# Patient Record
Sex: FEMALE | Race: WHITE | Employment: FULL TIME | URBAN - METROPOLITAN AREA
[De-identification: names, ages, dates, MRNs, and addresses within clinical notes are randomized per-mention and may not be internally consistent; named-entity substitution may affect disease eponyms.]

---

## 2018-10-08 ENCOUNTER — OFFICE VISIT (OUTPATIENT)
Dept: FAMILY MEDICINE CLINIC | Facility: CLINIC | Age: 53
End: 2018-10-08
Payer: COMMERCIAL

## 2018-10-08 VITALS
DIASTOLIC BLOOD PRESSURE: 70 MMHG | RESPIRATION RATE: 17 BRPM | HEART RATE: 57 BPM | OXYGEN SATURATION: 98 % | WEIGHT: 160 LBS | HEIGHT: 60 IN | SYSTOLIC BLOOD PRESSURE: 110 MMHG | BODY MASS INDEX: 31.41 KG/M2

## 2018-10-08 DIAGNOSIS — Z12.31 ENCOUNTER FOR SCREENING MAMMOGRAM FOR BREAST CANCER: ICD-10-CM

## 2018-10-08 DIAGNOSIS — E66.09 NON MORBID OBESITY DUE TO EXCESS CALORIES: ICD-10-CM

## 2018-10-08 DIAGNOSIS — E78.00 HYPERCHOLESTEREMIA: ICD-10-CM

## 2018-10-08 DIAGNOSIS — Z00.01 ENCOUNTER FOR ROUTINE ADULT HEALTH EXAMINATION WITH ABNORMAL FINDINGS: Primary | ICD-10-CM

## 2018-10-08 DIAGNOSIS — F32.0 CURRENT MILD EPISODE OF MAJOR DEPRESSIVE DISORDER WITHOUT PRIOR EPISODE (HCC): ICD-10-CM

## 2018-10-08 DIAGNOSIS — Z13.31 DEPRESSION SCREENING: ICD-10-CM

## 2018-10-08 PROCEDURE — 90471 IMMUNIZATION ADMIN: CPT

## 2018-10-08 PROCEDURE — 99386 PREV VISIT NEW AGE 40-64: CPT

## 2018-10-08 PROCEDURE — 90686 IIV4 VACC NO PRSV 0.5 ML IM: CPT

## 2018-10-08 RX ORDER — ATORVASTATIN CALCIUM 20 MG/1
20 TABLET, FILM COATED ORAL NIGHTLY
COMMUNITY
End: 2018-10-08

## 2018-10-09 PROBLEM — Z13.31 DEPRESSION SCREENING: Status: ACTIVE | Noted: 2018-10-09

## 2018-10-09 PROBLEM — E66.09 NON MORBID OBESITY DUE TO EXCESS CALORIES: Status: ACTIVE | Noted: 2018-10-09

## 2018-10-09 PROBLEM — E78.00 HYPERCHOLESTEREMIA: Status: ACTIVE | Noted: 2018-10-09

## 2018-10-09 PROBLEM — F32.0 CURRENT MILD EPISODE OF MAJOR DEPRESSIVE DISORDER WITHOUT PRIOR EPISODE (HCC): Status: ACTIVE | Noted: 2018-10-09

## 2018-10-09 PROBLEM — Z00.01 ENCOUNTER FOR ROUTINE ADULT HEALTH EXAMINATION WITH ABNORMAL FINDINGS: Status: ACTIVE | Noted: 2018-10-09

## 2018-10-09 PROBLEM — Z12.31 ENCOUNTER FOR SCREENING MAMMOGRAM FOR BREAST CANCER: Status: ACTIVE | Noted: 2018-10-09

## 2018-10-09 PROBLEM — F32.0 CURRENT MILD EPISODE OF MAJOR DEPRESSIVE DISORDER WITHOUT PRIOR EPISODE: Status: ACTIVE | Noted: 2018-10-09

## 2018-10-09 NOTE — PROGRESS NOTES
CC: Annual Physical Exam    HPI:   Josey Torres is a 52year old female who presents for a complete physical exam. Symptoms: denies discharge, itching, burning or dysuria, is menopausal. Patient denies any acute complaints at this time.      Wt Readings fr constipation, diarrhea, or blood in stool. MUSCULOSKELETAL:  Denies weakness, muscle aches, back pain, joint pain, swelling or stiffness.   NEUROLOGICAL:  Denies headache, seizures, dizziness, syncope, paralysis, ataxia, numbness or tingling in the extremi FROM.  : Deferred. RECTAL: Deferred. EXTREMITIES:  No edema, no cyanosis, no clubbing, FROM, 2+ dorsalis pedis pulses bilaterally. NEURO:  No deficit, normal gait, strength and tone, sensory intact, normal reflexes.     ASSESSMENT AND PLAN:   Hakeem Walton the LAST 2 WEEKS   Little interest or pleasure in doing things (over the last two weeks)?: Nearly every day    Feeling down, depressed, or hopeless (over the last two weeks)?: Nearly every day    PHQ-2 SCORE: 6     PHQ-9 Depression Screen     1.  Little int

## 2018-10-09 NOTE — PATIENT INSTRUCTIONS
Pautas de prevención para mujeres de entre 48 y 59 años de edad  [address] detección y las vacunas son importantes para el manejo de mendoza aviva.  La consejería sobre mendoza aviva también es esencial. A continuación, verá pautas en relación con esos temas p Clamidia Las mujeres con mayor riesgo de infección En los exámenes de rutina   Cáncer colorrectal Todas las mujeres de annie oneil de edad Ruma sigmoidoscopia flexible cada sisi años o ruma colonoscopia cada [place], o un enema con doble contraste de Liam Vigil Donniematt 29 de annie oneil de edad que no tienen registro de maryanne tenido esta infección o haberse aplicado esta vacuna Dos dosis.  La segunda dosis debería aplicársela al menos cuatro semanas después de la primera dosis   Hepatitis A Las muje Contra el herpes zóster [employer]  de 61 años o más [place] dosis   Consejería [place]   Pruebas sobre mutación de los genes BRCA para kelvin el riesgo de tener cáncer de ovario y cáncer de seno Las mujeres con mayor riesgo de tener mutación de

## 2018-10-15 ENCOUNTER — APPOINTMENT (OUTPATIENT)
Dept: LAB | Facility: HOSPITAL | Age: 53
End: 2018-10-15
Payer: COMMERCIAL

## 2018-10-15 DIAGNOSIS — Z00.01 ENCOUNTER FOR ROUTINE ADULT HEALTH EXAMINATION WITH ABNORMAL FINDINGS: ICD-10-CM

## 2018-10-15 DIAGNOSIS — E78.00 HYPERCHOLESTEREMIA: ICD-10-CM

## 2018-10-15 PROCEDURE — 81001 URINALYSIS AUTO W/SCOPE: CPT

## 2018-10-15 PROCEDURE — 84443 ASSAY THYROID STIM HORMONE: CPT

## 2018-10-15 PROCEDURE — 36415 COLL VENOUS BLD VENIPUNCTURE: CPT

## 2018-10-15 PROCEDURE — 80053 COMPREHEN METABOLIC PANEL: CPT

## 2018-10-15 PROCEDURE — 85027 COMPLETE CBC AUTOMATED: CPT

## 2018-10-15 PROCEDURE — 80061 LIPID PANEL: CPT

## 2018-10-17 ENCOUNTER — TELEPHONE (OUTPATIENT)
Dept: FAMILY MEDICINE CLINIC | Facility: CLINIC | Age: 53
End: 2018-10-17

## 2018-10-17 NOTE — TELEPHONE ENCOUNTER
----- Message from Jagdeep Etienne MD sent at 10/15/2018 11:57 AM CDT -----  Recommend diet/exercise and Omega 3 fish oil pills 2-4g PO daily; ok to file.

## 2018-10-30 ENCOUNTER — TELEPHONE (OUTPATIENT)
Dept: FAMILY MEDICINE CLINIC | Facility: CLINIC | Age: 53
End: 2018-10-30

## 2018-10-30 NOTE — TELEPHONE ENCOUNTER
Faxed over request for medical records to Dr. Diamond Oconnor requesting pap and blood work results to fax [phone]

## 2018-11-04 PROBLEM — Z00.01 ENCOUNTER FOR ROUTINE ADULT HEALTH EXAMINATION WITH ABNORMAL FINDINGS: Status: RESOLVED | Noted: 2018-10-09 | Resolved: 2018-11-04

## 2018-11-04 PROBLEM — Z13.31 DEPRESSION SCREENING: Status: RESOLVED | Noted: 2018-10-09 | Resolved: 2018-11-04

## 2018-11-14 ENCOUNTER — HOSPITAL ENCOUNTER (OUTPATIENT)
Dept: MAMMOGRAPHY | Facility: HOSPITAL | Age: 53
Discharge: HOME | End: 2018-11-14
Payer: COMMERCIAL

## 2018-11-14 DIAGNOSIS — Z12.31 ENCOUNTER FOR SCREENING MAMMOGRAM FOR BREAST CANCER: ICD-10-CM

## 2018-11-14 PROCEDURE — 77063 BREAST TOMOSYNTHESIS BI: CPT

## 2018-11-14 PROCEDURE — 77067 SCR MAMMO BI INCL CAD: CPT

## 2018-11-16 ENCOUNTER — TELEPHONE (OUTPATIENT)
Dept: FAMILY MEDICINE CLINIC | Facility: CLINIC | Age: 53
End: 2018-11-16

## 2018-11-16 NOTE — TELEPHONE ENCOUNTER
----- Message from Ashley Leone MD sent at 11/15/2018  8:18 PM CST -----  Results unremarkable; ok to file.

## 2018-12-17 ENCOUNTER — OFFICE VISIT (OUTPATIENT)
Dept: FAMILY MEDICINE CLINIC | Facility: CLINIC | Age: 53
End: 2018-12-17
Payer: COMMERCIAL

## 2018-12-17 VITALS
BODY MASS INDEX: 31.41 KG/M2 | SYSTOLIC BLOOD PRESSURE: 126 MMHG | HEART RATE: 77 BPM | DIASTOLIC BLOOD PRESSURE: 80 MMHG | OXYGEN SATURATION: 98 % | HEIGHT: 60 IN | TEMPERATURE: 98 F | WEIGHT: 160 LBS

## 2018-12-17 DIAGNOSIS — J40 BRONCHITIS: Primary | ICD-10-CM

## 2018-12-17 PROCEDURE — 99213 OFFICE O/P EST LOW 20 MIN: CPT | Performed by: FAMILY MEDICINE

## 2018-12-17 RX ORDER — FLUTICASONE PROPIONATE 50 MCG
2 SPRAY, SUSPENSION (ML) NASAL DAILY
Qty: 1 BOTTLE | Refills: 0 | Status: SHIPPED | OUTPATIENT
Start: 2018-12-17 | End: 2020-03-23

## 2018-12-17 RX ORDER — PROMETHAZINE HYDROCHLORIDE AND CODEINE PHOSPHATE 6.25; 1 MG/5ML; MG/5ML
5 SOLUTION ORAL EVERY 6 HOURS PRN
Qty: 118 ML | Refills: 0 | Status: SHIPPED | OUTPATIENT
Start: 2018-12-17 | End: 2019-10-10

## 2018-12-17 RX ORDER — LEVOCETIRIZINE DIHYDROCHLORIDE 5 MG/1
5 TABLET, FILM COATED ORAL EVERY EVENING
Qty: 30 TABLET | Refills: 0 | Status: SHIPPED | OUTPATIENT
Start: 2018-12-17 | End: 2020-03-23

## 2018-12-17 NOTE — PROGRESS NOTES
HPI:   Patient presents with:  Cough: c/c cough x1 week      Theodora Jones is a 52year old female presenting for:  Cough  -Symptoms for  1wk.   -Patient reports dry cough, post nasal drip, sneezing, mild nasal congestion  No SOB, wheezing, f/c, sore thro Cholesterol, Total 191 mg/dL    HDL Cholesterol 55 mg/dL    Direct  mg/dL    Triglycerides 134 mg/dL    Chol/HDL Ratio 3.50     Non HDL Chol 136 mg/dL   GLUCOSE, SERUM   Result Value Ref Range    Glucose 90 mg/dL   CREATININE, SERUM   Result Value R Hyperlipidemia          Past Surgical History:   Procedure Laterality Date   • APPENDECTOMY     •        No Known Allergies   Social History:  Social History    Tobacco Use      Smoking status: Never Smoker      Smokeless tobacco: Never Us Oropharynx is clear and moist.   Eyes: Conjunctivae are normal. Pupils are equal, round, and reactive to light. Cardiovascular: Normal rate, regular rhythm and normal heart sounds. No murmur heard.   Pulmonary/Chest: Effort normal and breath sounds norm Colorectal Screening due on 01/15/2013  Annual Depression Screen due on 10/08/2019  Annual Physical due on 10/08/2019  Mammogram due on 11/14/2019  Pap Smear,3 Years due on 09/21/2020  Influenza Vaccine Completed      Miri Manning MD

## 2018-12-18 ENCOUNTER — TELEPHONE (OUTPATIENT)
Dept: FAMILY MEDICINE CLINIC | Facility: CLINIC | Age: 53
End: 2018-12-18

## 2019-02-01 ENCOUNTER — TELEPHONE (OUTPATIENT)
Dept: FAMILY MEDICINE CLINIC | Facility: CLINIC | Age: 54
End: 2019-02-01

## 2019-02-01 NOTE — TELEPHONE ENCOUNTER
Faxed over request for coloscopy reports to fax number [phone] to [facility] center.

## 2019-10-10 ENCOUNTER — OFFICE VISIT (OUTPATIENT)
Dept: FAMILY MEDICINE CLINIC | Facility: CLINIC | Age: 54
End: 2019-10-10
Payer: COMMERCIAL

## 2019-10-10 VITALS
SYSTOLIC BLOOD PRESSURE: 124 MMHG | DIASTOLIC BLOOD PRESSURE: 86 MMHG | BODY MASS INDEX: 31.8 KG/M2 | HEART RATE: 57 BPM | OXYGEN SATURATION: 96 % | HEIGHT: 60 IN | WEIGHT: 162 LBS

## 2019-10-10 DIAGNOSIS — Z00.00 HEALTHCARE MAINTENANCE: ICD-10-CM

## 2019-10-10 DIAGNOSIS — Z00.00 WELLNESS EXAMINATION: Primary | ICD-10-CM

## 2019-10-10 DIAGNOSIS — E78.00 HYPERCHOLESTEREMIA: ICD-10-CM

## 2019-10-10 DIAGNOSIS — Z12.39 BREAST CANCER SCREENING: ICD-10-CM

## 2019-10-10 PROCEDURE — 81003 URINALYSIS AUTO W/O SCOPE: CPT | Performed by: FAMILY MEDICINE

## 2019-10-10 PROCEDURE — 36415 COLL VENOUS BLD VENIPUNCTURE: CPT | Performed by: FAMILY MEDICINE

## 2019-10-10 PROCEDURE — 80050 GENERAL HEALTH PANEL: CPT | Performed by: FAMILY MEDICINE

## 2019-10-10 PROCEDURE — 99396 PREV VISIT EST AGE 40-64: CPT | Performed by: FAMILY MEDICINE

## 2019-10-10 PROCEDURE — 83036 HEMOGLOBIN GLYCOSYLATED A1C: CPT | Performed by: FAMILY MEDICINE

## 2019-10-10 PROCEDURE — 80061 LIPID PANEL: CPT | Performed by: FAMILY MEDICINE

## 2019-10-10 NOTE — PROGRESS NOTES
UA,CMP,TSH,LIPID,A1C,and CBC labs drawn per Dr Gianni Quiles orders, Patient tolerated lab draw well

## 2019-10-10 NOTE — PROGRESS NOTES
CC: Annual Physical Exam    HPI:   Donnella Buerger is a 62year old female who presents for a complete physical exam.    HCM  -Diet:  Well-balanced.   -Exercise: active  -Mental Health: denies any depression or anxiety sx  -Skin care:  no concerning lesions/ Urobilinogen Urine <2.0 <2.0    Nitrite Urine Negative Negative    Leukocyte Esterase Urine Negative Negative    Microscopic Microscopic not indicated    COMP METABOLIC PANEL (14)   Result Value Ref Range    Glucose 83 70 - 99 mg/dL    Sodium 141 136 - 145 Frequency: Never    Drug use: Not on file           REVIEW OF SYSTEMS:   Review of Systems   Constitutional: Negative for fatigue and fever. Respiratory: Negative for cough and shortness of breath.     Cardiovascular: Negative for chest pain, palpitations presents for a complete physical exam.       Health maintenance, will check: Orders Placed This Encounter      CBC, Platelet, No Differential [E]      Hemoglobin A1C (Glycohemoglobin) [E]      Lipid Panel [E]      TSH W Reflex To Free T4 [E]      Adia Rivera

## 2019-10-14 ENCOUNTER — TELEPHONE (OUTPATIENT)
Dept: FAMILY MEDICINE CLINIC | Facility: CLINIC | Age: 54
End: 2019-10-14

## 2019-10-14 NOTE — TELEPHONE ENCOUNTER
----- Message from Yane Barbour MD sent at 10/11/2019  2:51 PM CDT -----  Please call patient and discuss results:  -All normal except   You have PRE-DIABETES. This is reversible with a low carbs/sugar diet and exercise.  In 3 months we need to

## 2019-10-22 ENCOUNTER — TELEPHONE (OUTPATIENT)
Dept: FAMILY MEDICINE CLINIC | Facility: CLINIC | Age: 54
End: 2019-10-22

## 2019-11-25 ENCOUNTER — HOSPITAL ENCOUNTER (OUTPATIENT)
Dept: MAMMOGRAPHY | Facility: HOSPITAL | Age: 54
Discharge: HOME | End: 2019-11-25
Attending: FAMILY MEDICINE
Payer: COMMERCIAL

## 2019-11-25 DIAGNOSIS — Z12.39 BREAST CANCER SCREENING: ICD-10-CM

## 2019-11-25 PROCEDURE — 77063 BREAST TOMOSYNTHESIS BI: CPT | Performed by: FAMILY MEDICINE

## 2019-11-25 PROCEDURE — 77067 SCR MAMMO BI INCL CAD: CPT | Performed by: FAMILY MEDICINE

## 2019-12-04 ENCOUNTER — TELEPHONE (OUTPATIENT)
Dept: FAMILY MEDICINE CLINIC | Facility: CLINIC | Age: 54
End: 2019-12-04

## 2019-12-04 NOTE — TELEPHONE ENCOUNTER
----- Message from Mary Choudhury MD sent at 11/26/2019  3:00 PM CST -----  Please let her know mammogram with normal density.  Next 1yr

## 2020-03-23 ENCOUNTER — OFFICE VISIT (OUTPATIENT)
Dept: FAMILY MEDICINE CLINIC | Facility: CLINIC | Age: 55
End: 2020-03-23
Payer: COMMERCIAL

## 2020-03-23 ENCOUNTER — TELEPHONE (OUTPATIENT)
Dept: FAMILY MEDICINE CLINIC | Facility: CLINIC | Age: 55
End: 2020-03-23

## 2020-03-23 VITALS
BODY MASS INDEX: 33.57 KG/M2 | OXYGEN SATURATION: 98 % | HEIGHT: 60 IN | TEMPERATURE: 97 F | DIASTOLIC BLOOD PRESSURE: 74 MMHG | WEIGHT: 171 LBS | HEART RATE: 67 BPM | SYSTOLIC BLOOD PRESSURE: 112 MMHG

## 2020-03-23 DIAGNOSIS — R39.9 URINARY SYMPTOM OR SIGN: Primary | ICD-10-CM

## 2020-03-23 DIAGNOSIS — Z12.4 PAP SMEAR FOR CERVICAL CANCER SCREENING: ICD-10-CM

## 2020-03-23 LAB
APPEARANCE: CLEAR
BILIRUBIN: NEGATIVE
GLUCOSE (URINE DIPSTICK): NEGATIVE MG/DL
KETONES (URINE DIPSTICK): NEGATIVE MG/DL
LEUKOCYTES: NEGATIVE
MULTISTIX LOT#: NORMAL NUMERIC
NITRITE, URINE: NEGATIVE
OCCULT BLOOD: NEGATIVE
PH, URINE: 7 (ref 4.5–8)
PROTEIN (URINE DIPSTICK): NEGATIVE MG/DL
SPECIFIC GRAVITY: 1.01 (ref 1–1.03)
URINE-COLOR: YELLOW
UROBILINOGEN,SEMI-QN: 0.2 MG/DL (ref 0–1.9)

## 2020-03-23 PROCEDURE — 87624 HPV HI-RISK TYP POOLED RSLT: CPT | Performed by: FAMILY MEDICINE

## 2020-03-23 PROCEDURE — 99214 OFFICE O/P EST MOD 30 MIN: CPT | Performed by: FAMILY MEDICINE

## 2020-03-23 PROCEDURE — 87086 URINE CULTURE/COLONY COUNT: CPT | Performed by: FAMILY MEDICINE

## 2020-03-23 PROCEDURE — 81003 URINALYSIS AUTO W/O SCOPE: CPT | Performed by: FAMILY MEDICINE

## 2020-03-23 RX ORDER — PHENAZOPYRIDINE HYDROCHLORIDE 100 MG/1
200 TABLET, FILM COATED ORAL 3 TIMES DAILY PRN
Qty: 12 TABLET | Refills: 0 | Status: SHIPPED | OUTPATIENT
Start: 2020-03-23 | End: 2020-10-22 | Stop reason: ALTCHOICE

## 2020-03-23 NOTE — PROGRESS NOTES
HPI:   Patient presents with:  Abdominal Pain: abdominal pain x3 days      Alondra Brock is a 60year old female presenting for:    -For the past 1wk having LQ abdominal pain and pressure ONLY with urination.  -noticed blood in urine and clots on 2 occas AM    ALB 4.1 10/10/2019 09:33 AM    TP 8.0 10/10/2019 09:33 AM    ALKPHO 96 10/10/2019 09:33 AM    AST 30 10/10/2019 09:33 AM    ALT 57 (H) 10/10/2019 09:33 AM    BILT 0.4 10/10/2019 09:33 AM    TSH 0.813 10/10/2019 09:33 AM          Medications:  Current Wt 171 lb (77.6 kg)   SpO2 98%   BMI 33.40 kg/m²  Estimated body mass index is 33.4 kg/m² as calculated from the following:    Height as of this encounter: 60\". Weight as of this encounter: 171 lb (77.6 kg).    Wt Readings from Last 3 Encounters:  03/2 THINPREP PAP WITH HPV REFLEX REQUEST B; Future  -     THINPREP PAP WITH HPV REFLEX REQUEST  -     HPV REFLEX PAP NEG W/ GENOTYPE           Return if symptoms worsen or fail to improve.   Patient indicates understanding of the above recommendations and agre

## 2020-03-23 NOTE — TELEPHONE ENCOUNTER
Pt called stating that since Thursday she started with a lot of pain when urinating, every time she will pee it would be all blood and the pain that she felt like burning sensation.  On Friday the same thing happened but then she had a really deep pain like

## 2020-03-24 ENCOUNTER — TELEPHONE (OUTPATIENT)
Dept: FAMILY MEDICINE CLINIC | Facility: CLINIC | Age: 55
End: 2020-03-24

## 2020-03-24 NOTE — TELEPHONE ENCOUNTER
Called patient,  verified, results below given, per patient medication given for pain helped states she is 70% better

## 2020-03-24 NOTE — TELEPHONE ENCOUNTER
----- Message from Ada Savage MD sent at 3/24/2020  5:13 PM CDT -----  Please let her know UCx is negative. If pain continues to subside then watchful waiting and monitoring urine for possible stone.  If worsens or clots again, then will [place] Antisampsones

## 2020-03-27 LAB — HPV I/H RISK 1 DNA SPEC QL NAA+PROBE: NEGATIVE

## 2020-03-30 ENCOUNTER — TELEPHONE (OUTPATIENT)
Dept: FAMILY MEDICINE CLINIC | Facility: CLINIC | Age: 55
End: 2020-03-30

## 2020-03-30 NOTE — TELEPHONE ENCOUNTER
Called patient,  verified, results below given, per patient she would call us if she has clots again or if pain returns        Notes recorded by Gypsy Paiz MD on 3/24/2020 at 5:13 PM CDT  Please let her know UCx is negative.  If pain jong

## 2020-03-30 NOTE — TELEPHONE ENCOUNTER
----- Message from Justine Vallecillo MD sent at 3/30/2020  3:51 PM CDT -----  Please let her know that her pap was normal. Next in 3-5yrs

## 2020-10-22 ENCOUNTER — OFFICE VISIT (OUTPATIENT)
Dept: FAMILY MEDICINE CLINIC | Facility: CLINIC | Age: 55
End: 2020-10-22
Payer: COMMERCIAL

## 2020-10-22 VITALS
DIASTOLIC BLOOD PRESSURE: 70 MMHG | WEIGHT: 172 LBS | OXYGEN SATURATION: 98 % | SYSTOLIC BLOOD PRESSURE: 108 MMHG | HEART RATE: 54 BPM | BODY MASS INDEX: 33.77 KG/M2 | HEIGHT: 60 IN

## 2020-10-22 DIAGNOSIS — E78.00 HYPERCHOLESTEREMIA: ICD-10-CM

## 2020-10-22 DIAGNOSIS — R53.83 OTHER FATIGUE: ICD-10-CM

## 2020-10-22 DIAGNOSIS — Z12.31 SCREENING MAMMOGRAM, ENCOUNTER FOR: ICD-10-CM

## 2020-10-22 DIAGNOSIS — Z11.59 NEED FOR HEPATITIS C SCREENING TEST: ICD-10-CM

## 2020-10-22 DIAGNOSIS — R73.09 ELEVATED GLUCOSE: ICD-10-CM

## 2020-10-22 DIAGNOSIS — R06.83 SNORING: Primary | ICD-10-CM

## 2020-10-22 DIAGNOSIS — Z00.00 ANNUAL PHYSICAL EXAM: ICD-10-CM

## 2020-10-22 PROCEDURE — 86803 HEPATITIS C AB TEST: CPT | Performed by: INTERNAL MEDICINE

## 2020-10-22 PROCEDURE — 99396 PREV VISIT EST AGE 40-64: CPT | Performed by: INTERNAL MEDICINE

## 2020-10-22 PROCEDURE — 3078F DIAST BP <80 MM HG: CPT | Performed by: INTERNAL MEDICINE

## 2020-10-22 PROCEDURE — 3074F SYST BP LT 130 MM HG: CPT | Performed by: INTERNAL MEDICINE

## 2020-10-22 PROCEDURE — 84439 ASSAY OF FREE THYROXINE: CPT | Performed by: INTERNAL MEDICINE

## 2020-10-22 PROCEDURE — 80050 GENERAL HEALTH PANEL: CPT | Performed by: INTERNAL MEDICINE

## 2020-10-22 PROCEDURE — 80061 LIPID PANEL: CPT | Performed by: INTERNAL MEDICINE

## 2020-10-22 PROCEDURE — 36415 COLL VENOUS BLD VENIPUNCTURE: CPT | Performed by: INTERNAL MEDICINE

## 2020-10-22 PROCEDURE — 3008F BODY MASS INDEX DOCD: CPT | Performed by: INTERNAL MEDICINE

## 2020-10-22 PROCEDURE — 99072 ADDL SUPL MATRL&STAF TM PHE: CPT | Performed by: INTERNAL MEDICINE

## 2020-10-22 PROCEDURE — 83036 HEMOGLOBIN GLYCOSYLATED A1C: CPT | Performed by: INTERNAL MEDICINE

## 2020-10-22 RX ORDER — OMEPRAZOLE 40 MG/1
40 CAPSULE, DELAYED RELEASE ORAL DAILY
Qty: 90 CAPSULE | Refills: 3 | Status: SHIPPED | OUTPATIENT
Start: 2020-10-22 | End: 2021-10-17

## 2020-11-02 NOTE — PROGRESS NOTES
[facility]  Return Patient Progress Note      HPI:   Patient presents with:  Physical: yearly exam       Clint Pike is a 60year old female presenting for: follow up.       Has a significant  has a past medical history of Hyperlipi 5.30 x10(6)uL    HGB 15.0 12.0 - 16.0 g/dL    HCT 45.1 35.0 - 48.0 %    MCV 97.4 80.0 - 100.0 fL    MCH 32.4 26.0 - 34.0 pg    MCHC 33.3 31.0 - 37.0 g/dL    RDW-SD 46.4 (H) 35.1 - 46.3 fL    RDW 13.0 11.0 - 15.0 %    .0 150.0 - 450.0 10(3)uL    Neut Procedure Laterality Date   • APPENDECTOMY     •          Allergies:  No Known Allergies   Social History:  Social History    Tobacco Use      Smoking status: Never Smoker      Smokeless tobacco: Never Used    Alcohol use: Alicia Venegas suicidal ideas, behavioral problems and sleep disturbance. The patient is not nervous/anxious.              PHYSICAL EXAM:   /70 (BP Location: Right arm, Patient Position: Sitting, Cuff Size: adult)   Pulse 54   Ht 60\"   Wt 172 lb (78 kg)   SpO2 98% ASSESSMENT AND PLAN:   Patient is a 60year old female who presents primarily presents for:    1. Snoring  - PULMONARY - INTERNAL for sleep study. Increased risk given weight gain.       2. Other fatigue  - PULMONARY - INTERNAL  - TSH normal. Metabolic Panel (14) [E]      HCV Antibody [E]      Hemoglobin A1C (Glycohemoglobin) [E]      Lipid Panel [E]      TSH and Free T4 [E]      Imaging & Consults:  PULMONARY - INTERNAL  ROB SCREENING BILAT (RAQ=[id number])          Patient indicates understanding

## 2020-11-06 ENCOUNTER — TELEPHONE (OUTPATIENT)
Dept: FAMILY MEDICINE CLINIC | Facility: CLINIC | Age: 55
End: 2020-11-06

## 2020-11-06 NOTE — TELEPHONE ENCOUNTER
LMTCB    ----- Message from Audie Ann MD sent at 11/4/2020 12:42 PM CST -----  Please inform that labs were reviewed. Her A1C was 5.8. Stable form before. Her lipid panel with  Increasing LDL.   Increase consumption of vegetables, fruits, herbs,

## 2020-11-09 ENCOUNTER — TELEPHONE (OUTPATIENT)
Dept: FAMILY MEDICINE CLINIC | Facility: CLINIC | Age: 55
End: 2020-11-09

## 2020-11-09 DIAGNOSIS — R53.83 OTHER FATIGUE: ICD-10-CM

## 2020-11-09 DIAGNOSIS — R06.83 SNORING: Primary | ICD-10-CM

## 2020-11-09 NOTE — TELEPHONE ENCOUNTER
New referral for Dr. Moises Gill placed; auto-authorized. Patient informed -- will mail copy with specialist contact information.

## 2020-11-09 NOTE — TELEPHONE ENCOUNTER
pts  called requesting another referral for sleep disorders, pulmonary. They tried to sched appt with the one they where referred and they dont have any available appts soon, until 01/25/21.    wants to know if pt can be refer somewhere else

## 2020-11-30 ENCOUNTER — HOSPITAL ENCOUNTER (OUTPATIENT)
Dept: MAMMOGRAPHY | Facility: HOSPITAL | Age: 55
Discharge: HOME | End: 2020-11-30
Attending: INTERNAL MEDICINE
Payer: COMMERCIAL

## 2020-11-30 DIAGNOSIS — Z12.31 SCREENING MAMMOGRAM, ENCOUNTER FOR: ICD-10-CM

## 2020-11-30 PROCEDURE — 77063 BREAST TOMOSYNTHESIS BI: CPT | Performed by: INTERNAL MEDICINE

## 2020-11-30 PROCEDURE — 77067 SCR MAMMO BI INCL CAD: CPT | Performed by: INTERNAL MEDICINE

## 2020-12-01 ENCOUNTER — TELEPHONE (OUTPATIENT)
Dept: FAMILY MEDICINE CLINIC | Facility: CLINIC | Age: 55
End: 2020-12-01

## 2020-12-01 NOTE — TELEPHONE ENCOUNTER
lmtcb    ----- Message from Richa Davies MD sent at 12/1/2020  8:40 AM CST -----  Please ensure she knows about her mammogram.  Yearly recommended.

## 2020-12-16 ENCOUNTER — ORDER TRANSCRIPTION (OUTPATIENT)
Dept: SLEEP CENTER | Age: 55
End: 2020-12-16

## 2020-12-16 DIAGNOSIS — G47.33 OBSTRUCTIVE SLEEP APNEA (ADULT) (PEDIATRIC): Primary | ICD-10-CM

## 2021-01-15 ENCOUNTER — OFFICE VISIT (OUTPATIENT)
Dept: SLEEP CENTER | Age: 56
End: 2021-01-15
Attending: INTERNAL MEDICINE
Payer: COMMERCIAL

## 2021-01-15 DIAGNOSIS — G47.33 OBSTRUCTIVE SLEEP APNEA (ADULT) (PEDIATRIC): ICD-10-CM

## 2021-01-15 PROCEDURE — 95810 POLYSOM 6/> YRS 4/> PARAM: CPT

## 2021-01-19 NOTE — PROCEDURES
[facility]  Accredited by the [employer] of Sleep Medicine (AASM)    PATIENT'S NAME: Ta Walsh   ATTENDING PHYSICIAN: Digna Harrington. Farida Bay MD   REFERRING PHYSICIAN: Digna Harrington.  Farida Bay MD   PATIENT ACCOUNT #: [de-identified] LOCATION: Sleep Center    RECOMMENDATIONS:    1. Aggressive weight reduction program.  2.   Avoid alcohol, sedatives, hypnotics, and caffeine. 3.   The patient should be warned of danger of driving or performing high-risk activity in a sleepy state.   4.   Nocturnal polysomnograp

## 2021-01-26 ENCOUNTER — ORDER TRANSCRIPTION (OUTPATIENT)
Dept: SLEEP CENTER | Age: 56
End: 2021-01-26

## 2021-01-26 DIAGNOSIS — G47.33 OBSTRUCTIVE SLEEP APNEA (ADULT) (PEDIATRIC): Primary | ICD-10-CM

## 2021-02-26 ENCOUNTER — LAB ENCOUNTER (OUTPATIENT)
Dept: LAB | Facility: HOSPITAL | Age: 56
End: 2021-02-26
Attending: INTERNAL MEDICINE
Payer: COMMERCIAL

## 2021-02-26 DIAGNOSIS — G47.33 OBSTRUCTIVE SLEEP APNEA (ADULT) (PEDIATRIC): ICD-10-CM

## 2021-02-26 LAB — SARS-COV-2 RNA RESP QL NAA+PROBE: NOT DETECTED

## 2021-03-01 ENCOUNTER — OFFICE VISIT (OUTPATIENT)
Dept: SLEEP CENTER | Age: 56
End: 2021-03-01
Attending: INTERNAL MEDICINE
Payer: COMMERCIAL

## 2021-03-01 DIAGNOSIS — G47.33 OBSTRUCTIVE SLEEP APNEA (ADULT) (PEDIATRIC): ICD-10-CM

## 2021-03-01 DIAGNOSIS — Z76.89 SLEEP CONCERN: Primary | ICD-10-CM

## 2021-03-01 PROCEDURE — 95811 POLYSOM 6/>YRS CPAP 4/> PARM: CPT

## 2021-03-05 ENCOUNTER — OFFICE VISIT (OUTPATIENT)
Dept: SLEEP CENTER | Age: 56
End: 2021-03-05
Attending: FAMILY MEDICINE
Payer: COMMERCIAL

## 2021-03-05 DIAGNOSIS — G47.33 OSA (OBSTRUCTIVE SLEEP APNEA): Primary | ICD-10-CM

## 2021-03-05 PROCEDURE — 95811 POLYSOM 6/>YRS CPAP 4/> PARM: CPT

## 2021-03-09 NOTE — PROCEDURES
[facility]  Accredited by the [employer] of Sleep Medicine (AASM)    PATIENT'S NAME: Caroline Ruizings PHYSICIAN:    REFERRING PHYSICIAN: Samina Oliveira MD   PATIENT ACCOUNT #: [id number] LOCATION: [address] [place] [address]

## 2021-09-22 ENCOUNTER — OFFICE VISIT (OUTPATIENT)
Dept: FAMILY MEDICINE CLINIC | Facility: CLINIC | Age: 56
End: 2021-09-22
Payer: COMMERCIAL

## 2021-09-22 VITALS
SYSTOLIC BLOOD PRESSURE: 116 MMHG | DIASTOLIC BLOOD PRESSURE: 70 MMHG | BODY MASS INDEX: 34.27 KG/M2 | WEIGHT: 170 LBS | HEIGHT: 59 IN | TEMPERATURE: 97 F | HEART RATE: 61 BPM | OXYGEN SATURATION: 99 %

## 2021-09-22 DIAGNOSIS — G47.33 OSA ON CPAP: ICD-10-CM

## 2021-09-22 DIAGNOSIS — Z12.31 SCREENING MAMMOGRAM, ENCOUNTER FOR: ICD-10-CM

## 2021-09-22 DIAGNOSIS — Z99.89 OSA ON CPAP: ICD-10-CM

## 2021-09-22 DIAGNOSIS — Z12.11 ENCOUNTER FOR SCREENING COLONOSCOPY: ICD-10-CM

## 2021-09-22 DIAGNOSIS — Z00.00 ANNUAL PHYSICAL EXAM: Primary | ICD-10-CM

## 2021-09-22 LAB
ALBUMIN SERPL-MCNC: 4 G/DL (ref 3.4–5)
ALBUMIN/GLOB SERPL: 1 {RATIO} (ref 1–2)
ALP LIVER SERPL-CCNC: 107 U/L
ALT SERPL-CCNC: 97 U/L
ANION GAP SERPL CALC-SCNC: 8 MMOL/L (ref 0–18)
AST SERPL-CCNC: 44 U/L (ref 15–37)
BASOPHILS # BLD AUTO: 0.06 X10(3) UL (ref 0–0.2)
BASOPHILS NFR BLD AUTO: 0.7 %
BILIRUB SERPL-MCNC: 0.2 MG/DL (ref 0.1–2)
BUN BLD-MCNC: 13 MG/DL (ref 7–18)
BUN/CREAT SERPL: 25.5 (ref 10–20)
CALCIUM BLD-MCNC: 9.7 MG/DL (ref 8.5–10.1)
CHLORIDE SERPL-SCNC: 109 MMOL/L (ref 98–112)
CHOLEST SERPL-MCNC: 217 MG/DL (ref ?–200)
CO2 SERPL-SCNC: 23 MMOL/L (ref 21–32)
CREAT BLD-MCNC: 0.51 MG/DL
DEPRECATED RDW RBC AUTO: 44.8 FL (ref 35.1–46.3)
EOSINOPHIL # BLD AUTO: 0.38 X10(3) UL (ref 0–0.7)
EOSINOPHIL NFR BLD AUTO: 4.4 %
ERYTHROCYTE [DISTWIDTH] IN BLOOD BY AUTOMATED COUNT: 12.4 % (ref 11–15)
GLOBULIN PLAS-MCNC: 4.1 G/DL (ref 2.8–4.4)
GLUCOSE BLD-MCNC: 81 MG/DL (ref 70–99)
HCT VFR BLD AUTO: 44.9 %
HDLC SERPL-MCNC: 52 MG/DL (ref 40–59)
HGB BLD-MCNC: 14.5 G/DL
IMM GRANULOCYTES # BLD AUTO: 0.02 X10(3) UL (ref 0–1)
IMM GRANULOCYTES NFR BLD: 0.2 %
LDLC SERPL CALC-MCNC: 147 MG/DL (ref ?–100)
LYMPHOCYTES # BLD AUTO: 3.46 X10(3) UL (ref 1–4)
LYMPHOCYTES NFR BLD AUTO: 40.4 %
MCH RBC QN AUTO: 31.5 PG (ref 26–34)
MCHC RBC AUTO-ENTMCNC: 32.3 G/DL (ref 31–37)
MCV RBC AUTO: 97.6 FL
MONOCYTES # BLD AUTO: 0.64 X10(3) UL (ref 0.1–1)
MONOCYTES NFR BLD AUTO: 7.5 %
NEUTROPHILS # BLD AUTO: 4.01 X10 (3) UL (ref 1.5–7.7)
NEUTROPHILS # BLD AUTO: 4.01 X10(3) UL (ref 1.5–7.7)
NEUTROPHILS NFR BLD AUTO: 46.8 %
NONHDLC SERPL-MCNC: 165 MG/DL (ref ?–130)
OSMOLALITY SERPL CALC.SUM OF ELEC: 289 MOSM/KG (ref 275–295)
PATIENT FASTING Y/N/NP: YES
PATIENT FASTING Y/N/NP: YES
PLATELET # BLD AUTO: 289 10(3)UL (ref 150–450)
POTASSIUM SERPL-SCNC: 4.2 MMOL/L (ref 3.5–5.1)
PROT SERPL-MCNC: 8.1 G/DL (ref 6.4–8.2)
RBC # BLD AUTO: 4.6 X10(6)UL
SODIUM SERPL-SCNC: 140 MMOL/L (ref 136–145)
TRIGL SERPL-MCNC: 103 MG/DL (ref 30–149)
VLDLC SERPL CALC-MCNC: 19 MG/DL (ref 0–30)
WBC # BLD AUTO: 8.6 X10(3) UL (ref 4–11)

## 2021-09-22 PROCEDURE — 3074F SYST BP LT 130 MM HG: CPT | Performed by: INTERNAL MEDICINE

## 2021-09-22 PROCEDURE — 85025 COMPLETE CBC W/AUTO DIFF WBC: CPT | Performed by: INTERNAL MEDICINE

## 2021-09-22 PROCEDURE — 3008F BODY MASS INDEX DOCD: CPT | Performed by: INTERNAL MEDICINE

## 2021-09-22 PROCEDURE — 80053 COMPREHEN METABOLIC PANEL: CPT | Performed by: INTERNAL MEDICINE

## 2021-09-22 PROCEDURE — 3078F DIAST BP <80 MM HG: CPT | Performed by: INTERNAL MEDICINE

## 2021-09-22 PROCEDURE — 99396 PREV VISIT EST AGE 40-64: CPT | Performed by: INTERNAL MEDICINE

## 2021-09-22 PROCEDURE — 80061 LIPID PANEL: CPT | Performed by: INTERNAL MEDICINE

## 2021-09-25 NOTE — PROGRESS NOTES
[facility]  Return Patient Progress Note      HPI:   Patient presents with:  Physical: yearly exam       Judi Marcano is a 60year old female presenting for: follow up.       Has a significant  has a past medical history of Hyperlipi Absolute 0.64 0.10 - 1.00 x10(3) uL    Eosinophil Absolute 0.38 0.00 - 0.70 x10(3) uL    Basophil Absolute 0.06 0.00 - 0.20 x10(3) uL    Immature Granulocyte Absolute 0.02 0.00 - 1.00 x10(3) uL    Neutrophil % 46.8 %    Lymphocyte % 40.4 %    Monocyte % 7. Known Problems Son    • Lipids Sister    • Lipids Brother    • No Known Problems Son    • Lipids Brother    • Lipids Brother    • Lipids Brother    • Lipids Brother    • Lipids Brother    • Lipids Sister    • Lipids Sister    • Lipids Sister    • Lipids Si Weight as of this encounter: 170 lb (77.1 kg). Wt Readings from Last 3 Encounters:  09/22/21 : 170 lb (77.1 kg)  10/22/20 : 172 lb (78 kg)  03/23/20 : 171 lb (77.6 kg)      Physical Exam   Vitals reviewed.    Constitutional: She is oriented to person, amita KRYSTYNA on CPAP  Reminded to follow up with pulmonology    (Z12.11) Encounter for screening colonoscopy  Plan: [address] Maintenance:  Influenza Vaccine(1) due on 10/01/2020: UTD received in Aug of this year.     Annual Physical due o

## 2021-10-06 ENCOUNTER — TELEPHONE (OUTPATIENT)
Dept: FAMILY MEDICINE CLINIC | Facility: CLINIC | Age: 56
End: 2021-10-06

## 2021-10-06 DIAGNOSIS — R74.8 ABNORMAL LIVER ENZYMES: Primary | ICD-10-CM

## 2021-10-06 NOTE — PROGRESS NOTES
Attempted to contact patient. No answer. Please inform her that her labs have been reviewed. Here liver enzymes are elevated, however, improved compared to previous. Her CBC is normal.     Lipid panel with rising LDL.    I recommend Increase consumption

## 2021-10-06 NOTE — TELEPHONE ENCOUNTER
lmtcb    ----- Message from Christine Lozano MD sent at 10/6/2021  5:01 PM CDT -----  Attempted to contact patient. No answer. Please inform her that her labs have been reviewed. Here liver enzymes are elevated, however, improved compared to previous.

## 2021-10-13 NOTE — TELEPHONE ENCOUNTER
Patient called back and results were discussed, an appt was already scheduled for her to be seen in 3 months. Orders placed to have her get labs prior to her appt.

## 2021-12-02 ENCOUNTER — HOSPITAL ENCOUNTER (OUTPATIENT)
Dept: MAMMOGRAPHY | Facility: HOSPITAL | Age: 56
Discharge: HOME | End: 2021-12-02
Attending: INTERNAL MEDICINE
Payer: COMMERCIAL

## 2021-12-02 DIAGNOSIS — Z12.31 SCREENING MAMMOGRAM, ENCOUNTER FOR: ICD-10-CM

## 2021-12-02 PROCEDURE — 77063 BREAST TOMOSYNTHESIS BI: CPT | Performed by: INTERNAL MEDICINE

## 2021-12-02 PROCEDURE — 77067 SCR MAMMO BI INCL CAD: CPT | Performed by: INTERNAL MEDICINE

## 2021-12-13 ENCOUNTER — LAB ENCOUNTER (OUTPATIENT)
Dept: LAB | Facility: HOSPITAL | Age: 56
End: 2021-12-13
Attending: INTERNAL MEDICINE
Payer: COMMERCIAL

## 2021-12-13 DIAGNOSIS — R74.8 ABNORMAL LIVER ENZYMES: ICD-10-CM

## 2021-12-13 PROCEDURE — 36415 COLL VENOUS BLD VENIPUNCTURE: CPT

## 2021-12-13 PROCEDURE — 80053 COMPREHEN METABOLIC PANEL: CPT

## 2021-12-18 NOTE — H&P
[facility] - Gastroenterology                                                                                                  Clinic History and Physical rashes, bruises  Psych: normal affect    Labs/Imaging:     Reviewed as noted in the HPI and A/P    ASSESSMENT/PLAN:   ***    #***    Recommend:  -colonoscopy with MAC with split golytely at [facility] or [facility]    Orders This Visit:  No orders of the defined

## 2021-12-21 ENCOUNTER — OFFICE VISIT (OUTPATIENT)
Dept: GASTROENTEROLOGY | Facility: CLINIC | Age: 56
End: 2021-12-21
Payer: COMMERCIAL

## 2021-12-21 ENCOUNTER — TELEPHONE (OUTPATIENT)
Dept: GASTROENTEROLOGY | Facility: CLINIC | Age: 56
End: 2021-12-21

## 2021-12-21 VITALS
HEART RATE: 46 BPM | DIASTOLIC BLOOD PRESSURE: 66 MMHG | WEIGHT: 176 LBS | BODY MASS INDEX: 35.48 KG/M2 | SYSTOLIC BLOOD PRESSURE: 128 MMHG | HEIGHT: 59 IN

## 2021-12-21 DIAGNOSIS — Z12.12 ENCOUNTER FOR COLORECTAL CANCER SCREENING: Primary | ICD-10-CM

## 2021-12-21 DIAGNOSIS — Z12.11 ENCOUNTER FOR COLORECTAL CANCER SCREENING: Primary | ICD-10-CM

## 2021-12-21 DIAGNOSIS — Z12.11 COLON CANCER SCREENING: Primary | ICD-10-CM

## 2021-12-21 PROCEDURE — 3078F DIAST BP <80 MM HG: CPT | Performed by: INTERNAL MEDICINE

## 2021-12-21 PROCEDURE — 3074F SYST BP LT 130 MM HG: CPT | Performed by: INTERNAL MEDICINE

## 2021-12-21 PROCEDURE — S0285 CNSLT BEFORE SCREEN COLONOSC: HCPCS | Performed by: INTERNAL MEDICINE

## 2021-12-21 NOTE — H&P
[facility] [facility] - Gastroenterology                                                                                                  Clinic History and Physical icterus, MMM; oral pharnyx is without exudates or lesions  Neck: no lymphadenopathy; thyroid is not enlarged and without nodules  CV: RRR  Resp: non-labored breathing  Abd: soft, non-tender, non-distended  Ext: no lower extremity swelling  Neuro: Alert, Or

## 2021-12-21 NOTE — TELEPHONE ENCOUNTER
Scheduled for:  Colonoscopy [id number]  Provider Name:  Dr Cherie Frank  Date:  2/2/2022  Location:  [facility]  Sedation:  MAC  Time:  11:30am (pt is aware that [facility] will call the day before to confirm arrival time)   Prep:  Trilyte  Meds/Allergies Reconciled?:  Physici

## 2021-12-21 NOTE — PATIENT INSTRUCTIONS
1. Programa mendoza colonoscopia con anesthesia con Dr. Shante Escobar    2. Recoja la preparación intestinal en la farmacia (split trilyte).  Marion comentamos, es importante eric la preparación intestinal en dos partes tomando 2 litros del líquido la noche anterior

## 2021-12-23 ENCOUNTER — OFFICE VISIT (OUTPATIENT)
Dept: FAMILY MEDICINE CLINIC | Facility: CLINIC | Age: 56
End: 2021-12-23
Payer: COMMERCIAL

## 2021-12-23 VITALS
WEIGHT: 174.81 LBS | HEART RATE: 58 BPM | SYSTOLIC BLOOD PRESSURE: 102 MMHG | DIASTOLIC BLOOD PRESSURE: 68 MMHG | BODY MASS INDEX: 35.24 KG/M2 | OXYGEN SATURATION: 98 % | HEIGHT: 59 IN

## 2021-12-23 DIAGNOSIS — R74.8 ELEVATED LIVER ENZYMES: Primary | ICD-10-CM

## 2021-12-23 PROCEDURE — 3008F BODY MASS INDEX DOCD: CPT | Performed by: INTERNAL MEDICINE

## 2021-12-23 PROCEDURE — 99213 OFFICE O/P EST LOW 20 MIN: CPT | Performed by: INTERNAL MEDICINE

## 2021-12-23 PROCEDURE — 3078F DIAST BP <80 MM HG: CPT | Performed by: INTERNAL MEDICINE

## 2021-12-23 PROCEDURE — 3074F SYST BP LT 130 MM HG: CPT | Performed by: INTERNAL MEDICINE

## 2021-12-23 RX ORDER — POLYETHYLENE GLYCOL 3350, SODIUM SULFATE ANHYDROUS, SODIUM BICARBONATE, SODIUM CHLORIDE, POTASSIUM CHLORIDE 236; 22.74; 6.74; 5.86; 2.97 G/4L; G/4L; G/4L; G/4L; G/4L
POWDER, FOR SOLUTION ORAL
COMMUNITY
Start: 2021-12-21

## 2021-12-26 NOTE — PROGRESS NOTES
[facility]  Return Patient Progress Note      HPI:   Patient presents with: Follow - Up: Discuss test results       Hoeny Ward is a 60year old female presenting for: follow up.       Has a significant  has a past medical history 04:09 PM    HDL 52 09/22/2021 04:09 PM    TRIG 103 09/22/2021 04:09 PM     (H) 09/22/2021 04:09 PM    NONHDLC 165 (H) 09/22/2021 04:09 PM        Medications:  Current Outpatient Medications   Medication Sig Dispense Refill   • PEG [id number] and neck pain. Skin: Negative for rash and wound. Allergic/Immunologic: Negative for food allergies and immunocompromised state.    Neurological: Negative for dizziness, seizures, facial asymmetry, speech difficulty, weakness, light-headedness, numbness Lymphadenopathy:     She has no cervical adenopathy. Neurological: She is alert and oriented to person, place, and time. She has normal reflexes. No cranial nerve deficit. Skin: Skin is warm. No rash noted. No erythema.    Psychiatric: She has a nino

## 2022-01-31 ENCOUNTER — LAB REQUISITION (OUTPATIENT)
Dept: SURGERY | Age: 57
End: 2022-01-31
Payer: COMMERCIAL

## 2022-02-01 LAB — SARS-COV-2 RNA RESP QL NAA+PROBE: NOT DETECTED

## 2022-02-02 ENCOUNTER — LAB REQUISITION (OUTPATIENT)
Dept: SURGERY | Age: 57
End: 2022-02-02
Payer: COMMERCIAL

## 2022-02-02 ENCOUNTER — SURGERY CENTER DOCUMENTATION (OUTPATIENT)
Dept: SURGERY | Age: 57
End: 2022-02-02

## 2022-02-02 PROCEDURE — 45385 COLONOSCOPY W/LESION REMOVAL: CPT | Performed by: INTERNAL MEDICINE

## 2022-02-02 PROCEDURE — 88305 TISSUE EXAM BY PATHOLOGIST: CPT | Performed by: INTERNAL MEDICINE

## 2022-02-02 PROCEDURE — 45380 COLONOSCOPY AND BIOPSY: CPT | Performed by: INTERNAL MEDICINE

## 2022-02-11 ENCOUNTER — TELEPHONE (OUTPATIENT)
Dept: GASTROENTEROLOGY | Facility: CLINIC | Age: 57
End: 2022-02-11

## 2022-02-11 NOTE — TELEPHONE ENCOUNTER
I mailed out colonoscopy results letter to pt  Updated health maintenance  Entered into 3 yr CLN recall  Recall colon in 3 years per.  Colon done 2/02/2022    Jona Roland MD  [facility] Clinical Staff  GI staff: please place recall for colonoscopy in 3 years

## 2022-10-17 ENCOUNTER — LAB ENCOUNTER (OUTPATIENT)
Dept: LAB | Facility: REFERENCE LAB | Age: 57
End: 2022-10-17
Attending: FAMILY MEDICINE
Payer: COMMERCIAL

## 2022-10-17 ENCOUNTER — OFFICE VISIT (OUTPATIENT)
Dept: INTERNAL MEDICINE CLINIC | Facility: CLINIC | Age: 57
End: 2022-10-17
Payer: COMMERCIAL

## 2022-10-17 VITALS
SYSTOLIC BLOOD PRESSURE: 118 MMHG | WEIGHT: 178 LBS | HEART RATE: 60 BPM | HEIGHT: 59.65 IN | BODY MASS INDEX: 34.95 KG/M2 | TEMPERATURE: 98 F | OXYGEN SATURATION: 97 % | DIASTOLIC BLOOD PRESSURE: 74 MMHG

## 2022-10-17 DIAGNOSIS — Z71.3 WEIGHT LOSS COUNSELING, ENCOUNTER FOR: ICD-10-CM

## 2022-10-17 DIAGNOSIS — Z12.31 ENCOUNTER FOR SCREENING MAMMOGRAM FOR MALIGNANT NEOPLASM OF BREAST: ICD-10-CM

## 2022-10-17 DIAGNOSIS — Z23 NEED FOR VACCINATION: ICD-10-CM

## 2022-10-17 DIAGNOSIS — Z00.00 WELLNESS EXAMINATION: ICD-10-CM

## 2022-10-17 DIAGNOSIS — Z00.00 WELLNESS EXAMINATION: Primary | ICD-10-CM

## 2022-10-17 PROBLEM — Z99.89 OSA ON CPAP: Status: ACTIVE | Noted: 2022-10-17

## 2022-10-17 PROBLEM — G47.33 OSA ON CPAP: Status: ACTIVE | Noted: 2022-10-17

## 2022-10-17 LAB
ALBUMIN SERPL-MCNC: 3.9 G/DL (ref 3.4–5)
ALBUMIN/GLOB SERPL: 1 {RATIO} (ref 1–2)
ALP LIVER SERPL-CCNC: 107 U/L
ALT SERPL-CCNC: 126 U/L
ANION GAP SERPL CALC-SCNC: 5 MMOL/L (ref 0–18)
AST SERPL-CCNC: 69 U/L (ref 15–37)
BASOPHILS # BLD AUTO: 0.07 X10(3) UL (ref 0–0.2)
BASOPHILS NFR BLD AUTO: 0.8 %
BILIRUB SERPL-MCNC: 0.5 MG/DL (ref 0.1–2)
BILIRUB UR QL: NEGATIVE
BUN BLD-MCNC: 14 MG/DL (ref 7–18)
BUN/CREAT SERPL: 24.6 (ref 10–20)
CALCIUM BLD-MCNC: 9.4 MG/DL (ref 8.5–10.1)
CHLORIDE SERPL-SCNC: 108 MMOL/L (ref 98–112)
CHOLEST SERPL-MCNC: 213 MG/DL (ref ?–200)
CLARITY UR: CLEAR
CO2 SERPL-SCNC: 26 MMOL/L (ref 21–32)
COLOR UR: YELLOW
CREAT BLD-MCNC: 0.57 MG/DL
DEPRECATED RDW RBC AUTO: 45.1 FL (ref 35.1–46.3)
EOSINOPHIL # BLD AUTO: 0.33 X10(3) UL (ref 0–0.7)
EOSINOPHIL NFR BLD AUTO: 3.9 %
ERYTHROCYTE [DISTWIDTH] IN BLOOD BY AUTOMATED COUNT: 12.5 % (ref 11–15)
EST. AVERAGE GLUCOSE BLD GHB EST-MCNC: 131 MG/DL (ref 68–126)
FASTING PATIENT LIPID ANSWER: NO
FASTING STATUS PATIENT QL REPORTED: NO
GFR SERPLBLD BASED ON 1.73 SQ M-ARVRAT: 105 ML/MIN/1.73M2 (ref 60–?)
GLOBULIN PLAS-MCNC: 4.1 G/DL (ref 2.8–4.4)
GLUCOSE BLD-MCNC: 78 MG/DL (ref 70–99)
GLUCOSE UR-MCNC: NEGATIVE MG/DL
HBA1C MFR BLD: 6.2 % (ref ?–5.7)
HCT VFR BLD AUTO: 46.3 %
HDLC SERPL-MCNC: 51 MG/DL (ref 40–59)
HGB BLD-MCNC: 15.2 G/DL
HGB UR QL STRIP.AUTO: NEGATIVE
IMM GRANULOCYTES # BLD AUTO: 0.04 X10(3) UL (ref 0–1)
IMM GRANULOCYTES NFR BLD: 0.5 %
KETONES UR-MCNC: NEGATIVE MG/DL
LDLC SERPL CALC-MCNC: 142 MG/DL (ref ?–100)
LEUKOCYTE ESTERASE UR QL STRIP.AUTO: NEGATIVE
LYMPHOCYTES # BLD AUTO: 3.34 X10(3) UL (ref 1–4)
LYMPHOCYTES NFR BLD AUTO: 39.1 %
MCH RBC QN AUTO: 32.1 PG (ref 26–34)
MCHC RBC AUTO-ENTMCNC: 32.8 G/DL (ref 31–37)
MCV RBC AUTO: 97.9 FL
MONOCYTES # BLD AUTO: 0.68 X10(3) UL (ref 0.1–1)
MONOCYTES NFR BLD AUTO: 8 %
NEUTROPHILS # BLD AUTO: 4.08 X10 (3) UL (ref 1.5–7.7)
NEUTROPHILS # BLD AUTO: 4.08 X10(3) UL (ref 1.5–7.7)
NEUTROPHILS NFR BLD AUTO: 47.7 %
NITRITE UR QL STRIP.AUTO: NEGATIVE
NONHDLC SERPL-MCNC: 162 MG/DL (ref ?–130)
OSMOLALITY SERPL CALC.SUM OF ELEC: 287 MOSM/KG (ref 275–295)
PH UR: 6 [PH] (ref 5–8)
PLATELET # BLD AUTO: 306 10(3)UL (ref 150–450)
POTASSIUM SERPL-SCNC: 4.1 MMOL/L (ref 3.5–5.1)
PROT SERPL-MCNC: 8 G/DL (ref 6.4–8.2)
PROT UR-MCNC: NEGATIVE MG/DL
RBC # BLD AUTO: 4.73 X10(6)UL
SODIUM SERPL-SCNC: 139 MMOL/L (ref 136–145)
SP GR UR STRIP: 1.02 (ref 1–1.03)
TRIGL SERPL-MCNC: 109 MG/DL (ref 30–149)
TSI SER-ACNC: 1.06 MIU/ML (ref 0.36–3.74)
UROBILINOGEN UR STRIP-ACNC: <2
VIT C UR-MCNC: NEGATIVE MG/DL
VLDLC SERPL CALC-MCNC: 20 MG/DL (ref 0–30)
WBC # BLD AUTO: 8.5 X10(3) UL (ref 4–11)

## 2022-10-17 PROCEDURE — 3078F DIAST BP <80 MM HG: CPT | Performed by: FAMILY MEDICINE

## 2022-10-17 PROCEDURE — 90471 IMMUNIZATION ADMIN: CPT | Performed by: FAMILY MEDICINE

## 2022-10-17 PROCEDURE — 80061 LIPID PANEL: CPT

## 2022-10-17 PROCEDURE — 90750 HZV VACC RECOMBINANT IM: CPT | Performed by: FAMILY MEDICINE

## 2022-10-17 PROCEDURE — 81003 URINALYSIS AUTO W/O SCOPE: CPT

## 2022-10-17 PROCEDURE — 3008F BODY MASS INDEX DOCD: CPT | Performed by: FAMILY MEDICINE

## 2022-10-17 PROCEDURE — 36415 COLL VENOUS BLD VENIPUNCTURE: CPT | Performed by: FAMILY MEDICINE

## 2022-10-17 PROCEDURE — 80053 COMPREHEN METABOLIC PANEL: CPT

## 2022-10-17 PROCEDURE — 83036 HEMOGLOBIN GLYCOSYLATED A1C: CPT

## 2022-10-17 PROCEDURE — 84443 ASSAY THYROID STIM HORMONE: CPT

## 2022-10-17 PROCEDURE — 3074F SYST BP LT 130 MM HG: CPT | Performed by: FAMILY MEDICINE

## 2022-10-17 PROCEDURE — 99212 OFFICE O/P EST SF 10 MIN: CPT | Performed by: FAMILY MEDICINE

## 2022-10-17 PROCEDURE — 99396 PREV VISIT EST AGE 40-64: CPT | Performed by: FAMILY MEDICINE

## 2022-10-17 PROCEDURE — 85025 COMPLETE CBC W/AUTO DIFF WBC: CPT | Performed by: FAMILY MEDICINE

## 2022-10-17 RX ORDER — TOPIRAMATE 25 MG/1
25 TABLET ORAL DAILY
Qty: 90 TABLET | Refills: 0 | Status: SHIPPED | OUTPATIENT
Start: 2022-10-17

## 2022-10-25 ENCOUNTER — TELEPHONE (OUTPATIENT)
Dept: INTERNAL MEDICINE CLINIC | Facility: CLINIC | Age: 57
End: 2022-10-25

## 2022-10-25 NOTE — TELEPHONE ENCOUNTER
----- Message from Ty Laird MD sent at 10/24/2022  9:18 AM CDT -----  Please let her know:  1) Prediabetes worsening. She is close to converting to diabetic if she does not do serious lifestyle changes. 2) TSH, UA, CBC wnl  3) electrolytes and kidney are normal but her liver is worsening. She still has the pending order of the liver ultrasound from 2021 that has not  yet and she needs to complete. 4) cholesterol is minimally improving.  Lifestyle changes as well

## 2022-11-29 ENCOUNTER — HOSPITAL ENCOUNTER (OUTPATIENT)
Dept: ULTRASOUND IMAGING | Age: 57
Discharge: HOME | End: 2022-11-29
Attending: INTERNAL MEDICINE
Payer: COMMERCIAL

## 2022-11-29 DIAGNOSIS — R74.8 ELEVATED LIVER ENZYMES: ICD-10-CM

## 2022-11-29 PROCEDURE — 76981 USE PARENCHYMA: CPT | Performed by: INTERNAL MEDICINE

## 2022-11-29 PROCEDURE — 76705 ECHO EXAM OF ABDOMEN: CPT | Performed by: INTERNAL MEDICINE

## 2022-12-09 ENCOUNTER — TELEPHONE (OUTPATIENT)
Dept: INTERNAL MEDICINE CLINIC | Facility: CLINIC | Age: 57
End: 2022-12-09

## 2022-12-09 DIAGNOSIS — E78.00 HYPERCHOLESTEREMIA: ICD-10-CM

## 2022-12-09 DIAGNOSIS — E66.09 NON MORBID OBESITY DUE TO EXCESS CALORIES: ICD-10-CM

## 2022-12-12 ENCOUNTER — HOSPITAL ENCOUNTER (OUTPATIENT)
Dept: MAMMOGRAPHY | Facility: HOSPITAL | Age: 57
Discharge: HOME | End: 2022-12-12
Attending: FAMILY MEDICINE
Payer: COMMERCIAL

## 2022-12-12 DIAGNOSIS — Z12.31 ENCOUNTER FOR SCREENING MAMMOGRAM FOR MALIGNANT NEOPLASM OF BREAST: ICD-10-CM

## 2022-12-12 PROCEDURE — 77067 SCR MAMMO BI INCL CAD: CPT | Performed by: FAMILY MEDICINE

## 2022-12-12 PROCEDURE — 77063 BREAST TOMOSYNTHESIS BI: CPT | Performed by: FAMILY MEDICINE

## 2022-12-12 NOTE — TELEPHONE ENCOUNTER
Results were discussed with patient and verbalized understanding of the results and the recommendations from the doctor. Name to dietician was provided since she is asking for one to teach her on how to make better meal/food choices.

## 2023-01-23 ENCOUNTER — OFFICE VISIT (OUTPATIENT)
Dept: INTERNAL MEDICINE CLINIC | Facility: CLINIC | Age: 58
End: 2023-01-23
Payer: COMMERCIAL

## 2023-01-23 ENCOUNTER — LAB ENCOUNTER (OUTPATIENT)
Dept: LAB | Facility: REFERENCE LAB | Age: 58
End: 2023-01-23
Attending: FAMILY MEDICINE
Payer: COMMERCIAL

## 2023-01-23 VITALS
WEIGHT: 174 LBS | TEMPERATURE: 98 F | HEART RATE: 67 BPM | BODY MASS INDEX: 34.16 KG/M2 | DIASTOLIC BLOOD PRESSURE: 70 MMHG | SYSTOLIC BLOOD PRESSURE: 114 MMHG | OXYGEN SATURATION: 97 % | HEIGHT: 59.65 IN

## 2023-01-23 DIAGNOSIS — Z71.3 WEIGHT LOSS COUNSELING, ENCOUNTER FOR: ICD-10-CM

## 2023-01-23 DIAGNOSIS — R73.03 PREDIABETES: ICD-10-CM

## 2023-01-23 DIAGNOSIS — K74.00 LIVER FIBROSIS: Primary | ICD-10-CM

## 2023-01-23 DIAGNOSIS — K74.00 LIVER FIBROSIS: ICD-10-CM

## 2023-01-23 LAB
ALBUMIN SERPL-MCNC: 3.7 G/DL (ref 3.4–5)
ALBUMIN/GLOB SERPL: 0.9 {RATIO} (ref 1–2)
ALP LIVER SERPL-CCNC: 110 U/L
ALT SERPL-CCNC: 63 U/L
ANION GAP SERPL CALC-SCNC: 8 MMOL/L (ref 0–18)
AST SERPL-CCNC: 31 U/L (ref 15–37)
BILIRUB SERPL-MCNC: 0.4 MG/DL (ref 0.1–2)
BUN BLD-MCNC: 16 MG/DL (ref 7–18)
BUN/CREAT SERPL: 26.2 (ref 10–20)
CALCIUM BLD-MCNC: 9.2 MG/DL (ref 8.5–10.1)
CHLORIDE SERPL-SCNC: 110 MMOL/L (ref 98–112)
CO2 SERPL-SCNC: 22 MMOL/L (ref 21–32)
CREAT BLD-MCNC: 0.61 MG/DL
EST. AVERAGE GLUCOSE BLD GHB EST-MCNC: 123 MG/DL (ref 68–126)
FASTING STATUS PATIENT QL REPORTED: YES
GFR SERPLBLD BASED ON 1.73 SQ M-ARVRAT: 102 ML/MIN/1.73M2 (ref 60–?)
GLOBULIN PLAS-MCNC: 4 G/DL (ref 2.8–4.4)
GLUCOSE BLD-MCNC: 90 MG/DL (ref 70–99)
HAV AB SER QL IA: REACTIVE
HAV IGM SER QL: NONREACTIVE
HBA1C MFR BLD: 5.9 % (ref ?–5.7)
HBV CORE AB SERPL QL IA: NONREACTIVE
HBV SURFACE AB SER QL: NONREACTIVE
HBV SURFACE AB SERPL IA-ACNC: <3.1 MIU/ML
HBV SURFACE AG SERPL QL IA: NONREACTIVE
HCV AB SERPL QL IA: NONREACTIVE
OSMOLALITY SERPL CALC.SUM OF ELEC: 291 MOSM/KG (ref 275–295)
POTASSIUM SERPL-SCNC: 4.3 MMOL/L (ref 3.5–5.1)
PROT SERPL-MCNC: 7.7 G/DL (ref 6.4–8.2)
SODIUM SERPL-SCNC: 140 MMOL/L (ref 136–145)

## 2023-01-23 PROCEDURE — 3074F SYST BP LT 130 MM HG: CPT | Performed by: FAMILY MEDICINE

## 2023-01-23 PROCEDURE — 80053 COMPREHEN METABOLIC PANEL: CPT

## 2023-01-23 PROCEDURE — 36415 COLL VENOUS BLD VENIPUNCTURE: CPT

## 2023-01-23 PROCEDURE — 86709 HEPATITIS A IGM ANTIBODY: CPT

## 2023-01-23 PROCEDURE — 86704 HEP B CORE ANTIBODY TOTAL: CPT

## 2023-01-23 PROCEDURE — 86706 HEP B SURFACE ANTIBODY: CPT

## 2023-01-23 PROCEDURE — 83036 HEMOGLOBIN GLYCOSYLATED A1C: CPT

## 2023-01-23 PROCEDURE — 86708 HEPATITIS A ANTIBODY: CPT

## 2023-01-23 PROCEDURE — 80503 PATH CLIN CONSLTJ SF 5-20: CPT

## 2023-01-23 PROCEDURE — 87340 HEPATITIS B SURFACE AG IA: CPT

## 2023-01-23 PROCEDURE — 3008F BODY MASS INDEX DOCD: CPT | Performed by: FAMILY MEDICINE

## 2023-01-23 PROCEDURE — 86803 HEPATITIS C AB TEST: CPT

## 2023-01-23 PROCEDURE — 99214 OFFICE O/P EST MOD 30 MIN: CPT | Performed by: FAMILY MEDICINE

## 2023-01-23 PROCEDURE — 3078F DIAST BP <80 MM HG: CPT | Performed by: FAMILY MEDICINE

## 2023-05-08 ENCOUNTER — OFFICE VISIT (OUTPATIENT)
Dept: INTERNAL MEDICINE CLINIC | Facility: CLINIC | Age: 58
End: 2023-05-08
Payer: COMMERCIAL

## 2023-05-08 VITALS
TEMPERATURE: 98 F | SYSTOLIC BLOOD PRESSURE: 124 MMHG | OXYGEN SATURATION: 98 % | DIASTOLIC BLOOD PRESSURE: 80 MMHG | HEART RATE: 60 BPM | BODY MASS INDEX: 33.18 KG/M2 | WEIGHT: 169 LBS | HEIGHT: 59.65 IN

## 2023-05-08 DIAGNOSIS — Z71.3 WEIGHT LOSS COUNSELING, ENCOUNTER FOR: Primary | ICD-10-CM

## 2023-05-08 DIAGNOSIS — E78.00 HYPERCHOLESTEREMIA: ICD-10-CM

## 2023-05-08 DIAGNOSIS — Z00.00 HEALTHCARE MAINTENANCE: ICD-10-CM

## 2023-05-08 DIAGNOSIS — R73.03 PRE-DIABETES: ICD-10-CM

## 2023-10-02 ENCOUNTER — LAB ENCOUNTER (OUTPATIENT)
Dept: LAB | Facility: REFERENCE LAB | Age: 58
End: 2023-10-02
Attending: FAMILY MEDICINE
Payer: COMMERCIAL

## 2023-10-02 DIAGNOSIS — R73.03 PRE-DIABETES: ICD-10-CM

## 2023-10-02 DIAGNOSIS — Z00.00 HEALTHCARE MAINTENANCE: ICD-10-CM

## 2023-10-02 DIAGNOSIS — E78.00 HYPERCHOLESTEREMIA: ICD-10-CM

## 2023-10-02 LAB
ALBUMIN SERPL-MCNC: 3.7 G/DL (ref 3.4–5)
ALBUMIN/GLOB SERPL: 0.9 {RATIO} (ref 1–2)
ALP LIVER SERPL-CCNC: 108 U/L
ALT SERPL-CCNC: 53 U/L
ANION GAP SERPL CALC-SCNC: 7 MMOL/L (ref 0–18)
AST SERPL-CCNC: 24 U/L (ref 15–37)
BASOPHILS # BLD AUTO: 0.09 X10(3) UL (ref 0–0.2)
BASOPHILS NFR BLD AUTO: 1.3 %
BILIRUB SERPL-MCNC: 0.4 MG/DL (ref 0.1–2)
BILIRUB UR QL: NEGATIVE
BUN BLD-MCNC: 16 MG/DL (ref 7–18)
BUN/CREAT SERPL: 27.6 (ref 10–20)
CALCIUM BLD-MCNC: 9.3 MG/DL (ref 8.5–10.1)
CHLORIDE SERPL-SCNC: 111 MMOL/L (ref 98–112)
CHOLEST SERPL-MCNC: 216 MG/DL (ref ?–200)
CLARITY UR: CLEAR
CO2 SERPL-SCNC: 24 MMOL/L (ref 21–32)
CREAT BLD-MCNC: 0.58 MG/DL
DEPRECATED RDW RBC AUTO: 44.8 FL (ref 35.1–46.3)
EGFRCR SERPLBLD CKD-EPI 2021: 104 ML/MIN/1.73M2 (ref 60–?)
EOSINOPHIL # BLD AUTO: 0.25 X10(3) UL (ref 0–0.7)
EOSINOPHIL NFR BLD AUTO: 3.6 %
ERYTHROCYTE [DISTWIDTH] IN BLOOD BY AUTOMATED COUNT: 12.4 % (ref 11–15)
EST. AVERAGE GLUCOSE BLD GHB EST-MCNC: 120 MG/DL (ref 68–126)
FASTING PATIENT LIPID ANSWER: YES
FASTING STATUS PATIENT QL REPORTED: YES
GLOBULIN PLAS-MCNC: 4.2 G/DL (ref 2.8–4.4)
GLUCOSE BLD-MCNC: 100 MG/DL (ref 70–99)
GLUCOSE UR-MCNC: NORMAL MG/DL
HBA1C MFR BLD: 5.8 % (ref ?–5.7)
HCT VFR BLD AUTO: 45.1 %
HDLC SERPL-MCNC: 49 MG/DL (ref 40–59)
HGB BLD-MCNC: 14.6 G/DL
HGB UR QL STRIP.AUTO: NEGATIVE
IMM GRANULOCYTES # BLD AUTO: 0.03 X10(3) UL (ref 0–1)
IMM GRANULOCYTES NFR BLD: 0.4 %
KETONES UR-MCNC: NEGATIVE MG/DL
LDLC SERPL CALC-MCNC: 150 MG/DL (ref ?–100)
LEUKOCYTE ESTERASE UR QL STRIP.AUTO: NEGATIVE
LYMPHOCYTES # BLD AUTO: 2.68 X10(3) UL (ref 1–4)
LYMPHOCYTES NFR BLD AUTO: 38.8 %
MCH RBC QN AUTO: 31.6 PG (ref 26–34)
MCHC RBC AUTO-ENTMCNC: 32.4 G/DL (ref 31–37)
MCV RBC AUTO: 97.6 FL
MONOCYTES # BLD AUTO: 0.51 X10(3) UL (ref 0.1–1)
MONOCYTES NFR BLD AUTO: 7.4 %
NEUTROPHILS # BLD AUTO: 3.35 X10 (3) UL (ref 1.5–7.7)
NEUTROPHILS # BLD AUTO: 3.35 X10(3) UL (ref 1.5–7.7)
NEUTROPHILS NFR BLD AUTO: 48.5 %
NITRITE UR QL STRIP.AUTO: NEGATIVE
NONHDLC SERPL-MCNC: 167 MG/DL (ref ?–130)
OSMOLALITY SERPL CALC.SUM OF ELEC: 295 MOSM/KG (ref 275–295)
PH UR: 7 [PH] (ref 5–8)
PLATELET # BLD AUTO: 302 10(3)UL (ref 150–450)
POTASSIUM SERPL-SCNC: 4.1 MMOL/L (ref 3.5–5.1)
PROT SERPL-MCNC: 7.9 G/DL (ref 6.4–8.2)
PROT UR-MCNC: NEGATIVE MG/DL
RBC # BLD AUTO: 4.62 X10(6)UL
SODIUM SERPL-SCNC: 142 MMOL/L (ref 136–145)
SP GR UR STRIP: 1.02 (ref 1–1.03)
TRIGL SERPL-MCNC: 97 MG/DL (ref 30–149)
TSI SER-ACNC: 1.45 MIU/ML (ref 0.36–3.74)
UROBILINOGEN UR STRIP-ACNC: NORMAL
VLDLC SERPL CALC-MCNC: 18 MG/DL (ref 0–30)
WBC # BLD AUTO: 6.9 X10(3) UL (ref 4–11)

## 2023-10-02 PROCEDURE — 80050 GENERAL HEALTH PANEL: CPT | Performed by: FAMILY MEDICINE

## 2023-10-02 PROCEDURE — 83036 HEMOGLOBIN GLYCOSYLATED A1C: CPT | Performed by: FAMILY MEDICINE

## 2023-10-02 PROCEDURE — 81003 URINALYSIS AUTO W/O SCOPE: CPT | Performed by: FAMILY MEDICINE

## 2023-10-02 PROCEDURE — 80061 LIPID PANEL: CPT | Performed by: FAMILY MEDICINE

## 2023-10-09 ENCOUNTER — OFFICE VISIT (OUTPATIENT)
Dept: INTERNAL MEDICINE CLINIC | Facility: CLINIC | Age: 58
End: 2023-10-09
Payer: COMMERCIAL

## 2023-10-09 VITALS
HEIGHT: 59.65 IN | HEART RATE: 74 BPM | SYSTOLIC BLOOD PRESSURE: 128 MMHG | BODY MASS INDEX: 33.96 KG/M2 | DIASTOLIC BLOOD PRESSURE: 72 MMHG | WEIGHT: 173 LBS | OXYGEN SATURATION: 98 % | TEMPERATURE: 98 F

## 2023-10-09 DIAGNOSIS — Z23 NEED FOR VACCINATION: ICD-10-CM

## 2023-10-09 DIAGNOSIS — E78.00 HYPERCHOLESTEREMIA: ICD-10-CM

## 2023-10-09 DIAGNOSIS — Z12.31 ENCOUNTER FOR SCREENING MAMMOGRAM FOR MALIGNANT NEOPLASM OF BREAST: ICD-10-CM

## 2023-10-09 DIAGNOSIS — Z23 NEED FOR INFLUENZA VACCINATION: ICD-10-CM

## 2023-10-09 DIAGNOSIS — R73.03 PRE-DIABETES: ICD-10-CM

## 2023-10-09 DIAGNOSIS — Z00.00 WELLNESS EXAMINATION: Primary | ICD-10-CM

## 2023-10-09 NOTE — PROGRESS NOTES
FLUZONE 0.5ml administered to LD IM, SHINGRIX 0.5ml administered to RD IM, VIS given to patient, Patient tolerated vaccines well

## 2023-12-30 ENCOUNTER — HOSPITAL ENCOUNTER (OUTPATIENT)
Dept: MAMMOGRAPHY | Facility: HOSPITAL | Age: 58
Discharge: HOME | End: 2023-12-30
Attending: FAMILY MEDICINE
Payer: COMMERCIAL

## 2023-12-30 DIAGNOSIS — Z12.31 ENCOUNTER FOR SCREENING MAMMOGRAM FOR MALIGNANT NEOPLASM OF BREAST: ICD-10-CM

## 2023-12-30 PROCEDURE — 77067 SCR MAMMO BI INCL CAD: CPT | Performed by: FAMILY MEDICINE

## 2023-12-30 PROCEDURE — 77063 BREAST TOMOSYNTHESIS BI: CPT | Performed by: FAMILY MEDICINE

## 2024-01-15 ENCOUNTER — TELEPHONE (OUTPATIENT)
Dept: INTERNAL MEDICINE CLINIC | Facility: CLINIC | Age: 59
End: 2024-01-15

## 2024-01-15 NOTE — TELEPHONE ENCOUNTER
----- Message from Ashley Suh MD sent at 1/4/2024 10:05 AM CST -----  Please let her know that her mammogram is stable with benign appearing density. Next in 1yr

## 2024-04-08 ENCOUNTER — OFFICE VISIT (OUTPATIENT)
Dept: INTERNAL MEDICINE CLINIC | Facility: CLINIC | Age: 59
End: 2024-04-08
Payer: COMMERCIAL

## 2024-04-08 VITALS
SYSTOLIC BLOOD PRESSURE: 134 MMHG | HEIGHT: 59.45 IN | TEMPERATURE: 98 F | DIASTOLIC BLOOD PRESSURE: 72 MMHG | HEART RATE: 60 BPM | BODY MASS INDEX: 34.02 KG/M2 | OXYGEN SATURATION: 98 % | WEIGHT: 171 LBS

## 2024-04-08 DIAGNOSIS — R73.03 PRE-DIABETES: ICD-10-CM

## 2024-04-08 DIAGNOSIS — E78.00 HYPERCHOLESTEREMIA: ICD-10-CM

## 2024-04-08 DIAGNOSIS — K76.0 FATTY LIVER: ICD-10-CM

## 2024-04-08 DIAGNOSIS — Z00.00 HEALTHCARE MAINTENANCE: ICD-10-CM

## 2024-04-08 DIAGNOSIS — K74.00 LIVER FIBROSIS: Primary | ICD-10-CM

## 2024-04-08 NOTE — PROGRESS NOTES
HPI:     Chief Complaint   Patient presents with    Follow - Up       Carlene Littlejohn is a 59 year old female presenting for:    Had liver changes back in 2022 but now liver enzymes are normal. Would like f/u in regards to this as wokring on lifestyle changes        Results for orders placed or performed in visit on 10/02/23   Comp Metabolic Panel (14)   Result Value Ref Range    Glucose 100 (H) 70 - 99 mg/dL    Sodium 142 136 - 145 mmol/L    Potassium 4.1 3.5 - 5.1 mmol/L    Chloride 111 98 - 112 mmol/L    CO2 24.0 21.0 - 32.0 mmol/L    Anion Gap 7 0 - 18 mmol/L    BUN 16 7 - 18 mg/dL    Creatinine 0.58 0.55 - 1.02 mg/dL    BUN/CREA Ratio 27.6 (H) 10.0 - 20.0    Calcium, Total 9.3 8.5 - 10.1 mg/dL    Calculated Osmolality 295 275 - 295 mOsm/kg    eGFR-Cr 104 >=60 mL/min/1.73m2    ALT 53 13 - 56 U/L    AST 24 15 - 37 U/L    Alkaline Phosphatase 108 46 - 118 U/L    Bilirubin, Total 0.4 0.1 - 2.0 mg/dL    Total Protein 7.9 6.4 - 8.2 g/dL    Albumin 3.7 3.4 - 5.0 g/dL    Globulin  4.2 2.8 - 4.4 g/dL    A/G Ratio 0.9 (L) 1.0 - 2.0    Patient Fasting for CMP? Yes    Hemoglobin A1C   Result Value Ref Range    HgbA1C 5.8 (H) <5.7 %    Estimated Average Glucose 120 68 - 126 mg/dL   Lipid Panel   Result Value Ref Range    Cholesterol, Total 216 (H) <200 mg/dL    HDL Cholesterol 49 40 - 59 mg/dL    Triglycerides 97 30 - 149 mg/dL    LDL Cholesterol 150 (H) <100 mg/dL    VLDL 18 0 - 30 mg/dL    Non HDL Chol 167 (H) <130 mg/dL    Patient Fasting for Lipid? Yes    TSH W Reflex To Free T4   Result Value Ref Range    TSH 1.450 0.358 - 3.740 mIU/mL   Urinalysis, Routine   Result Value Ref Range    Urine Color Light-Yellow Yellow    Clarity Urine Clear Clear    Spec Gravity 1.017 1.005 - 1.030    Glucose Urine Normal Normal mg/dL    Bilirubin Urine Negative Negative    Ketones Urine Negative Negative mg/dL    Blood Urine Negative Negative    pH Urine 7.0 5.0 - 8.0    Protein Urine Negative Negative mg/dL    Urobilinogen Urine Normal  Normal    Nitrite Urine Negative Negative    Leukocyte Esterase Urine Negative Negative    Microscopic Microscopic not indicated    CBC W/ DIFFERENTIAL   Result Value Ref Range    WBC 6.9 4.0 - 11.0 x10(3) uL    RBC 4.62 3.80 - 5.30 x10(6)uL    HGB 14.6 12.0 - 16.0 g/dL    HCT 45.1 35.0 - 48.0 %    MCV 97.6 80.0 - 100.0 fL    MCH 31.6 26.0 - 34.0 pg    MCHC 32.4 31.0 - 37.0 g/dL    RDW-SD 44.8 35.1 - 46.3 fL    RDW 12.4 11.0 - 15.0 %    .0 150.0 - 450.0 10(3)uL    Neutrophil Absolute Prelim 3.35 1.50 - 7.70 x10 (3) uL    Neutrophil Absolute 3.35 1.50 - 7.70 x10(3) uL    Lymphocyte Absolute 2.68 1.00 - 4.00 x10(3) uL    Monocyte Absolute 0.51 0.10 - 1.00 x10(3) uL    Eosinophil Absolute 0.25 0.00 - 0.70 x10(3) uL    Basophil Absolute 0.09 0.00 - 0.20 x10(3) uL    Immature Granulocyte Absolute 0.03 0.00 - 1.00 x10(3) uL    Neutrophil % 48.5 %    Lymphocyte % 38.8 %    Monocyte % 7.4 %    Eosinophil % 3.6 %    Basophil % 1.3 %    Immature Granulocyte % 0.4 %       Labs:   Lab Results   Component Value Date/Time    A1C 5.8 (H) 10/02/2023 07:57 AM      Lab Results   Component Value Date/Time    CHOLEST 216 (H) 10/02/2023 07:57 AM    HDL 49 10/02/2023 07:57 AM    TRIG 97 10/02/2023 07:57 AM     (H) 10/02/2023 07:57 AM    NONHDLC 167 (H) 10/02/2023 07:57 AM       Lab Results   Component Value Date/Time     (H) 10/02/2023 07:57 AM     10/02/2023 07:57 AM    K 4.1 10/02/2023 07:57 AM     10/02/2023 07:57 AM    CO2 24.0 10/02/2023 07:57 AM    CREATSERUM 0.58 10/02/2023 07:57 AM    CA 9.3 10/02/2023 07:57 AM    ALB 3.7 10/02/2023 07:57 AM    TP 7.9 10/02/2023 07:57 AM    ALKPHO 108 10/02/2023 07:57 AM    AST 24 10/02/2023 07:57 AM    ALT 53 10/02/2023 07:57 AM    BILT 0.4 10/02/2023 07:57 AM    TSH 1.450 10/02/2023 07:57 AM    T4F 0.9 10/22/2020 11:31 AM          Medications:  No current outpatient medications on file.      Past Medical History:    Hyperlipidemia         Past Surgical History:    Procedure Laterality Date    Appendectomy        1986    Colonoscopy  2022     No Known Allergies   Social History:  Social History     Socioeconomic History    Marital status:    Tobacco Use    Smoking status: Never    Smokeless tobacco: Never   Substance and Sexual Activity    Alcohol use: No      Family History:  Family History   Problem Relation Age of Onset    Lipids Father     No Known Problems Mother     No Known Problems Daughter     No Known Problems Son     Lipids Sister     Lipids Brother     No Known Problems Son     Lipids Brother     Lipids Brother     Lipids Brother     Lipids Brother     Lipids Brother     Lipids Sister     Lipids Sister     Lipids Sister     Lipids Sister     Cancer Neg     Diabetes Neg           REVIEW OF SYSTEMS:   Review of Systems   Constitutional:  Negative for chills, fatigue and fever.   Respiratory:  Negative for cough and shortness of breath.    Cardiovascular:  Negative for chest pain, palpitations and leg swelling.   Gastrointestinal:  Negative for abdominal pain, constipation, diarrhea and vomiting.   Neurological:  Negative for headaches.            PHYSICAL EXAM:   /72   Pulse 60   Temp 98.4 °F (36.9 °C)   Ht 4' 11.45\" (1.51 m)   Wt 171 lb (77.6 kg)   SpO2 98%   BMI 34.02 kg/m²  Estimated body mass index is 34.02 kg/m² as calculated from the following:    Height as of this encounter: 4' 11.45\" (1.51 m).    Weight as of this encounter: 171 lb (77.6 kg).     Wt Readings from Last 3 Encounters:   24 171 lb (77.6 kg)   10/09/23 173 lb (78.5 kg)   23 169 lb (76.7 kg)       Physical Exam  Vitals reviewed.   Constitutional:       General: She is not in acute distress.     Appearance: She is well-developed.   Cardiovascular:      Rate and Rhythm: Normal rate and regular rhythm.      Heart sounds: Normal heart sounds. No murmur heard.  Pulmonary:      Effort: Pulmonary effort is normal. No respiratory distress.      Breath  sounds: Normal breath sounds.   Abdominal:      General: Bowel sounds are normal. There is no distension.      Palpations: Abdomen is soft.      Tenderness: There is no abdominal tenderness.   Musculoskeletal:      Right lower leg: No edema.      Left lower leg: No edema.   Neurological:      General: No focal deficit present.             ASSESSMENT AND PLAN:   Patient is a 59 year old female who presents primarily presents for:    Diagnoses and all orders for this visit:    Liver fibrosis  -     Cancel: Hepatic Function Panel (7) [E]; Future  -     US LIVER WITH ELASTOGRAPHY(CPT=76705,07157); Future  -     Cancel: Comp Metabolic Panel (14); Future  -     Comp Metabolic Panel (14); Future    Fatty liver  -     Cancel: Hepatic Function Panel (7) [E]; Future  -     US LIVER WITH ELASTOGRAPHY(CPT=76705,68472); Future  -     Cancel: Comp Metabolic Panel (14); Future  -     Comp Metabolic Panel (14); Future    Healthcare maintenance  -     Cancel: CBC With Differential With Platelet; Future  -     Cancel: Comp Metabolic Panel (14); Future  -     Cancel: Hemoglobin A1C; Future  -     Cancel: Lipid Panel; Future  -     Cancel: TSH W Reflex To Free T4; Future  -     Cancel: Urinalysis, Routine; Future  -     CBC With Differential With Platelet; Future  -     Comp Metabolic Panel (14); Future  -     Hemoglobin A1C; Future  -     Lipid Panel; Future  -     TSH W Reflex To Free T4; Future  -     Urinalysis, Routine; Future    Hypercholesteremia  -     Cancel: Lipid Panel; Future  -     Lipid Panel; Future    Pre-diabetes  -     Cancel: Hemoglobin A1C; Future  -     Hemoglobin A1C; Future           Return in about 6 months (around 10/8/2024) for physical.  Patient indicates understanding of the above recommendations and agrees to the above plan.  Reasurrance and education provided. All questions answered.  Notified to call with any questions, complications, allergies, or worsening or changing symptoms as well as any side  effects or complications from the treatments .  Red flags/ ER precautions discussed.    Spent 30 minutes including chart review, reviewing appropriate medical history, evaluating patient, discussing treatment options, counseling and education (diet and exercise), ordering appropriate diagnostic tests and completing documentation.        Meds & Refills for this Visit:  Requested Prescriptions      No prescriptions requested or ordered in this encounter       Orders Placed This Encounter   Procedures    CBC With Differential With Platelet    Comp Metabolic Panel (14)    Hemoglobin A1C    Lipid Panel    TSH W Reflex To Free T4    Urinalysis, Routine       Imaging & Consults:  US LIVER WITH ELASTOGRAPHY(CPT=76705,47609)    Health Maintenance:  Health Maintenance   Topic Date Due    COVID-19 Vaccine (5 - 2023-24 season) 09/01/2023    DTaP,Tdap,and Td Vaccines (2 - Td or Tdap) 09/19/2024    Annual Physical  10/09/2024    Mammogram  12/30/2024    Colorectal Cancer Screening  02/02/2025    Pap Smear  03/23/2025    Influenza Vaccine  Completed    Annual Depression Screening  Completed    Zoster Vaccines  Completed    Pneumococcal Vaccine: Birth to 64yrs  Aged Out         Ashley Suh MD

## 2024-10-07 ENCOUNTER — ORDER TRANSCRIPTION (OUTPATIENT)
Dept: ADMINISTRATIVE | Facility: HOSPITAL | Age: 59
End: 2024-10-07

## 2024-10-07 ENCOUNTER — TELEPHONE (OUTPATIENT)
Dept: INTERNAL MEDICINE CLINIC | Facility: CLINIC | Age: 59
End: 2024-10-07

## 2024-10-07 DIAGNOSIS — Z12.31 ENCOUNTER FOR SCREENING MAMMOGRAM FOR MALIGNANT NEOPLASM OF BREAST: Primary | ICD-10-CM

## 2024-10-07 DIAGNOSIS — Z12.11 COLON CANCER SCREENING: Primary | ICD-10-CM

## 2024-10-14 ENCOUNTER — LAB ENCOUNTER (OUTPATIENT)
Dept: LAB | Facility: HOSPITAL | Age: 59
End: 2024-10-14
Attending: FAMILY MEDICINE
Payer: COMMERCIAL

## 2024-10-14 DIAGNOSIS — Z00.00 HEALTHCARE MAINTENANCE: ICD-10-CM

## 2024-10-14 DIAGNOSIS — R73.03 PRE-DIABETES: ICD-10-CM

## 2024-10-14 DIAGNOSIS — E78.00 HYPERCHOLESTEREMIA: ICD-10-CM

## 2024-10-14 DIAGNOSIS — K74.00 LIVER FIBROSIS: ICD-10-CM

## 2024-10-14 DIAGNOSIS — K76.0 FATTY LIVER: ICD-10-CM

## 2024-10-14 LAB
ALBUMIN SERPL-MCNC: 4.5 G/DL (ref 3.2–4.8)
ALBUMIN/GLOB SERPL: 1.4 {RATIO} (ref 1–2)
ALP LIVER SERPL-CCNC: 118 U/L
ALT SERPL-CCNC: 50 U/L
ANION GAP SERPL CALC-SCNC: 8 MMOL/L (ref 0–18)
AST SERPL-CCNC: 31 U/L (ref ?–34)
BASOPHILS # BLD AUTO: 0.08 X10(3) UL (ref 0–0.2)
BASOPHILS NFR BLD AUTO: 1 %
BILIRUB SERPL-MCNC: 0.5 MG/DL (ref 0.2–1.1)
BILIRUB UR QL: NEGATIVE
BUN BLD-MCNC: 15 MG/DL (ref 9–23)
BUN/CREAT SERPL: 26.8 (ref 10–20)
CALCIUM BLD-MCNC: 9.5 MG/DL (ref 8.7–10.4)
CHLORIDE SERPL-SCNC: 108 MMOL/L (ref 98–112)
CHOLEST SERPL-MCNC: 215 MG/DL (ref ?–200)
CLARITY UR: CLEAR
CO2 SERPL-SCNC: 25 MMOL/L (ref 21–32)
CREAT BLD-MCNC: 0.56 MG/DL
DEPRECATED RDW RBC AUTO: 43.8 FL (ref 35.1–46.3)
EGFRCR SERPLBLD CKD-EPI 2021: 104 ML/MIN/1.73M2 (ref 60–?)
EOSINOPHIL # BLD AUTO: 0.32 X10(3) UL (ref 0–0.7)
EOSINOPHIL NFR BLD AUTO: 4 %
ERYTHROCYTE [DISTWIDTH] IN BLOOD BY AUTOMATED COUNT: 12.3 % (ref 11–15)
EST. AVERAGE GLUCOSE BLD GHB EST-MCNC: 126 MG/DL (ref 68–126)
FASTING PATIENT LIPID ANSWER: YES
FASTING STATUS PATIENT QL REPORTED: YES
GLOBULIN PLAS-MCNC: 3.2 G/DL (ref 2–3.5)
GLUCOSE BLD-MCNC: 97 MG/DL (ref 70–99)
GLUCOSE UR-MCNC: NORMAL MG/DL
HBA1C MFR BLD: 6 % (ref ?–5.7)
HCT VFR BLD AUTO: 44.8 %
HDLC SERPL-MCNC: 47 MG/DL (ref 40–59)
HGB BLD-MCNC: 15.1 G/DL
HGB UR QL STRIP.AUTO: NEGATIVE
IMM GRANULOCYTES # BLD AUTO: 0.04 X10(3) UL (ref 0–1)
IMM GRANULOCYTES NFR BLD: 0.5 %
KETONES UR-MCNC: NEGATIVE MG/DL
LDLC SERPL CALC-MCNC: 149 MG/DL (ref ?–100)
LEUKOCYTE ESTERASE UR QL STRIP.AUTO: NEGATIVE
LYMPHOCYTES # BLD AUTO: 2.76 X10(3) UL (ref 1–4)
LYMPHOCYTES NFR BLD AUTO: 34.8 %
MCH RBC QN AUTO: 32.1 PG (ref 26–34)
MCHC RBC AUTO-ENTMCNC: 33.7 G/DL (ref 31–37)
MCV RBC AUTO: 95.3 FL
MONOCYTES # BLD AUTO: 0.7 X10(3) UL (ref 0.1–1)
MONOCYTES NFR BLD AUTO: 8.8 %
NEUTROPHILS # BLD AUTO: 4.02 X10 (3) UL (ref 1.5–7.7)
NEUTROPHILS # BLD AUTO: 4.02 X10(3) UL (ref 1.5–7.7)
NEUTROPHILS NFR BLD AUTO: 50.9 %
NITRITE UR QL STRIP.AUTO: NEGATIVE
NONHDLC SERPL-MCNC: 168 MG/DL (ref ?–130)
OSMOLALITY SERPL CALC.SUM OF ELEC: 293 MOSM/KG (ref 275–295)
PH UR: 7 [PH] (ref 5–8)
PLATELET # BLD AUTO: 299 10(3)UL (ref 150–450)
POTASSIUM SERPL-SCNC: 4.4 MMOL/L (ref 3.5–5.1)
PROT SERPL-MCNC: 7.7 G/DL (ref 5.7–8.2)
PROT UR-MCNC: NEGATIVE MG/DL
RBC # BLD AUTO: 4.7 X10(6)UL
SODIUM SERPL-SCNC: 141 MMOL/L (ref 136–145)
SP GR UR STRIP: 1.02 (ref 1–1.03)
TRIGL SERPL-MCNC: 106 MG/DL (ref 30–149)
TSI SER-ACNC: 1.13 MIU/ML (ref 0.55–4.78)
UROBILINOGEN UR STRIP-ACNC: NORMAL
VLDLC SERPL CALC-MCNC: 20 MG/DL (ref 0–30)
WBC # BLD AUTO: 7.9 X10(3) UL (ref 4–11)

## 2024-10-14 PROCEDURE — 80050 GENERAL HEALTH PANEL: CPT | Performed by: FAMILY MEDICINE

## 2024-10-14 PROCEDURE — 80061 LIPID PANEL: CPT | Performed by: FAMILY MEDICINE

## 2024-10-14 PROCEDURE — 81003 URINALYSIS AUTO W/O SCOPE: CPT | Performed by: FAMILY MEDICINE

## 2024-10-14 PROCEDURE — 83036 HEMOGLOBIN GLYCOSYLATED A1C: CPT | Performed by: FAMILY MEDICINE

## 2024-10-18 ENCOUNTER — LAB ENCOUNTER (OUTPATIENT)
Dept: LAB | Facility: HOSPITAL | Age: 59
End: 2024-10-18
Attending: FAMILY MEDICINE
Payer: COMMERCIAL

## 2024-10-18 ENCOUNTER — OFFICE VISIT (OUTPATIENT)
Dept: INTERNAL MEDICINE CLINIC | Facility: CLINIC | Age: 59
End: 2024-10-18
Payer: COMMERCIAL

## 2024-10-18 VITALS
WEIGHT: 177 LBS | BODY MASS INDEX: 35.21 KG/M2 | DIASTOLIC BLOOD PRESSURE: 72 MMHG | HEIGHT: 59.5 IN | TEMPERATURE: 98 F | HEART RATE: 58 BPM | SYSTOLIC BLOOD PRESSURE: 110 MMHG | OXYGEN SATURATION: 99 %

## 2024-10-18 DIAGNOSIS — R73.03 PRE-DIABETES: ICD-10-CM

## 2024-10-18 DIAGNOSIS — Z13.6 SCREENING FOR CARDIOVASCULAR CONDITION: ICD-10-CM

## 2024-10-18 DIAGNOSIS — E78.00 HYPERCHOLESTEREMIA: ICD-10-CM

## 2024-10-18 DIAGNOSIS — R53.82 CHRONIC FATIGUE: ICD-10-CM

## 2024-10-18 DIAGNOSIS — Z00.00 WELLNESS EXAMINATION: Primary | ICD-10-CM

## 2024-10-18 DIAGNOSIS — F32.0 CURRENT MILD EPISODE OF MAJOR DEPRESSIVE DISORDER WITHOUT PRIOR EPISODE (HCC): ICD-10-CM

## 2024-10-18 DIAGNOSIS — K74.00 LIVER FIBROSIS: ICD-10-CM

## 2024-10-18 DIAGNOSIS — Z12.11 COLON CANCER SCREENING: ICD-10-CM

## 2024-10-18 DIAGNOSIS — Z23 NEED FOR INFLUENZA VACCINATION: ICD-10-CM

## 2024-10-18 LAB
VIT B12 SERPL-MCNC: 654 PG/ML (ref 211–911)
VIT D+METAB SERPL-MCNC: 15.3 NG/ML (ref 30–100)

## 2024-10-18 PROCEDURE — 82306 VITAMIN D 25 HYDROXY: CPT

## 2024-10-18 PROCEDURE — 82607 VITAMIN B-12: CPT

## 2024-10-18 PROCEDURE — 36415 COLL VENOUS BLD VENIPUNCTURE: CPT

## 2024-10-18 RX ORDER — SERTRALINE HYDROCHLORIDE 25 MG/1
25 TABLET, FILM COATED ORAL DAILY
Qty: 90 TABLET | Refills: 0 | Status: SHIPPED | OUTPATIENT
Start: 2024-10-18

## 2024-10-18 NOTE — PROGRESS NOTES
CC: Annual Physical Exam    HPI:   Carlene Littlejohn is a 59 year old female who presents for a complete physical exam.    HCM  -Diet:  Well-balanced.   -Exercise: sedentary  -Skin care:  no concerning lesions/moles  -Menopause:  no issues    Requesting vitamin D/B12 to be added to bloodwork as lately has been increasingly tired  Some anhedonia, low mood,  Has been suffering from depression for years but flaring up.  NO si/hi      Wt Readings from Last 6 Encounters:   10/18/24 177 lb (80.3 kg)   04/08/24 171 lb (77.6 kg)   10/09/23 173 lb (78.5 kg)   05/08/23 169 lb (76.7 kg)   01/23/23 174 lb (78.9 kg)   10/17/22 178 lb (80.7 kg)     Body mass index is 35.15 kg/m².     Results for orders placed or performed in visit on 10/14/24   CBC With Differential With Platelet    Collection Time: 10/14/24  7:55 AM   Result Value Ref Range    WBC 7.9 4.0 - 11.0 x10(3) uL    RBC 4.70 3.80 - 5.30 x10(6)uL    HGB 15.1 12.0 - 16.0 g/dL    HCT 44.8 35.0 - 48.0 %    MCV 95.3 80.0 - 100.0 fL    MCH 32.1 26.0 - 34.0 pg    MCHC 33.7 31.0 - 37.0 g/dL    RDW-SD 43.8 35.1 - 46.3 fL    RDW 12.3 11.0 - 15.0 %    .0 150.0 - 450.0 10(3)uL    Neutrophil Absolute Prelim 4.02 1.50 - 7.70 x10 (3) uL    Neutrophil Absolute 4.02 1.50 - 7.70 x10(3) uL    Lymphocyte Absolute 2.76 1.00 - 4.00 x10(3) uL    Monocyte Absolute 0.70 0.10 - 1.00 x10(3) uL    Eosinophil Absolute 0.32 0.00 - 0.70 x10(3) uL    Basophil Absolute 0.08 0.00 - 0.20 x10(3) uL    Immature Granulocyte Absolute 0.04 0.00 - 1.00 x10(3) uL    Neutrophil % 50.9 %    Lymphocyte % 34.8 %    Monocyte % 8.8 %    Eosinophil % 4.0 %    Basophil % 1.0 %    Immature Granulocyte % 0.5 %   Comp Metabolic Panel (14)    Collection Time: 10/14/24  7:55 AM   Result Value Ref Range    Glucose 97 70 - 99 mg/dL    Sodium 141 136 - 145 mmol/L    Potassium 4.4 3.5 - 5.1 mmol/L    Chloride 108 98 - 112 mmol/L    CO2 25.0 21.0 - 32.0 mmol/L    Anion Gap 8 0 - 18 mmol/L    BUN 15 9 - 23 mg/dL    Creatinine  0.56 0.55 - 1.02 mg/dL    BUN/CREA Ratio 26.8 (H) 10.0 - 20.0    Calcium, Total 9.5 8.7 - 10.4 mg/dL    Calculated Osmolality 293 275 - 295 mOsm/kg    eGFR-Cr 104 >=60 mL/min/1.73m2    ALT 50 (H) 10 - 49 U/L    AST 31 <34 U/L    Alkaline Phosphatase 118 50 - 130 U/L    Bilirubin, Total 0.5 0.2 - 1.1 mg/dL    Total Protein 7.7 5.7 - 8.2 g/dL    Albumin 4.5 3.2 - 4.8 g/dL    Globulin  3.2 2.0 - 3.5 g/dL    A/G Ratio 1.4 1.0 - 2.0    Patient Fasting for CMP? Yes    Hemoglobin A1C    Collection Time: 10/14/24  7:55 AM   Result Value Ref Range    HgbA1C 6.0 (H) <5.7 %    Estimated Average Glucose 126 68 - 126 mg/dL   Lipid Panel    Collection Time: 10/14/24  7:55 AM   Result Value Ref Range    Cholesterol, Total 215 (H) <200 mg/dL    HDL Cholesterol 47 40 - 59 mg/dL    Triglycerides 106 30 - 149 mg/dL    LDL Cholesterol 149 (H) <100 mg/dL    VLDL 20 0 - 30 mg/dL    Non HDL Chol 168 (H) <130 mg/dL    Patient Fasting for Lipid? Yes    TSH W Reflex To Free T4    Collection Time: 10/14/24  7:55 AM   Result Value Ref Range    TSH 1.133 0.550 - 4.780 mIU/mL   Urinalysis, Routine    Collection Time: 10/14/24  7:55 AM   Result Value Ref Range    Urine Color Light-Yellow Yellow    Clarity Urine Clear Clear    Spec Gravity 1.019 1.005 - 1.030    Glucose Urine Normal Normal mg/dL    Bilirubin Urine Negative Negative    Ketones Urine Negative Negative mg/dL    Blood Urine Negative Negative    pH Urine 7.0 5.0 - 8.0    Protein Urine Negative Negative mg/dL    Urobilinogen Urine Normal Normal    Nitrite Urine Negative Negative    Leukocyte Esterase Urine Negative Negative    Microscopic Microscopic not indicated        Current Outpatient Medications   Medication Sig Dispense Refill    sertraline 25 MG Oral Tab Take 1 tablet (25 mg total) by mouth daily. 90 tablet 0      Allergies[1]   Past Medical History:    Hyperlipidemia      Past Surgical History:   Procedure Laterality Date    Appendectomy            Colonoscopy   02/02/2022      Family History   Problem Relation Age of Onset    Lipids Father     No Known Problems Mother     No Known Problems Daughter     No Known Problems Son     Lipids Sister     Lipids Brother     No Known Problems Son     Lipids Brother     Lipids Brother     Lipids Brother     Lipids Brother     Lipids Brother     Lipids Sister     Lipids Sister     Lipids Sister     Lipids Sister     Cancer Neg     Diabetes Neg       Social History:   Social History     Socioeconomic History    Marital status:    Tobacco Use    Smoking status: Never    Smokeless tobacco: Never   Substance and Sexual Activity    Alcohol use: No            REVIEW OF SYSTEMS:   Review of Systems   Constitutional:  Negative for chills, fatigue and fever.   Respiratory:  Negative for cough and shortness of breath.    Cardiovascular:  Negative for chest pain, palpitations and leg swelling.   Gastrointestinal:  Negative for abdominal pain, constipation, diarrhea and vomiting.   Neurological:  Negative for headaches.   Psychiatric/Behavioral:  Positive for dysphoric mood. The patient is nervous/anxious.             PHYSICAL EXAM:   /72   Pulse 58   Temp 98.1 °F (36.7 °C)   Ht 4' 11.5\" (1.511 m)   Wt 177 lb (80.3 kg)   SpO2 99%   BMI 35.15 kg/m²  Estimated body mass index is 35.15 kg/m² as calculated from the following:    Height as of this encounter: 4' 11.5\" (1.511 m).    Weight as of this encounter: 177 lb (80.3 kg).     Wt Readings from Last 3 Encounters:   10/18/24 177 lb (80.3 kg)   04/08/24 171 lb (77.6 kg)   10/09/23 173 lb (78.5 kg)       Physical Exam  Vitals reviewed.   Constitutional:       General: She is not in acute distress.     Appearance: She is well-developed.   HENT:      Head: Normocephalic.      Right Ear: Tympanic membrane and external ear normal.      Left Ear: Tympanic membrane and external ear normal.   Eyes:      Conjunctiva/sclera: Conjunctivae normal.      Pupils: Pupils are equal, round, and  reactive to light.   Neck:      Thyroid: No thyromegaly.   Cardiovascular:      Rate and Rhythm: Normal rate and regular rhythm.      Heart sounds: Normal heart sounds. No murmur heard.  Pulmonary:      Effort: Pulmonary effort is normal. No respiratory distress.      Breath sounds: Normal breath sounds.   Abdominal:      General: Bowel sounds are normal. There is no distension.      Palpations: Abdomen is soft.      Tenderness: There is no abdominal tenderness.   Musculoskeletal:         General: Normal range of motion.      Cervical back: Normal range of motion and neck supple.      Right lower leg: No edema.      Left lower leg: No edema.   Skin:     Findings: No rash.   Neurological:      General: No focal deficit present.      Cranial Nerves: No cranial nerve deficit.           ASSESSMENT AND PLAN:   Carlene Littlejohn is a 59 year old female who presents for a complete physical exam.    Health maintenance, will check:   Orders Placed This Encounter   Procedures    Vitamin D    Vitamin B12    Fluzone trivalent vaccine, PF 0.5mL, 6mo+ (86017)       Diagnoses and all orders for this visit:    Wellness examination  -     Pt reassured and all questions answered.  -     Age/sex specific preventive measures/immunizations reviewed and discussed with pt.  -     Pt counseled with regards to diet and exercise.    Need for influenza vaccination  -     Fluzone trivalent vaccine, PF 0.5mL, 6mo+ (76915)    Colon cancer screening  -     Gastro GI Telephone Colon Screen; Future    Current mild episode of major depressive disorder without prior episode (HCC)  -     sertraline 25 MG Oral Tab; Take 1 tablet (25 mg total) by mouth daily.  -     Julio Drake Navigator    -start SSRI . Discussed side effects, delayed effectiveness, increased suicidality  -no suicidal ideation. safety plan discussed.   - referral provided  -encouraged stress-relieving techniques     Chronic fatigue  -     Vitamin D; Future  -     Vitamin B12;  Future  -Supportive care discussed including proper sleep, hydration, healthy eating, exercise, stress-relieve  -Bloodwork obtained    Screening for cardiovascular condition  -     CT CALCIUM SCORING; Future    Hypercholesteremia  -will hold off meds  The 10-year ASCVD risk score (Lizzette STANFORD, et al., 2019) is: 3.2%    Values used to calculate the score:      Age: 59 years      Sex: Female      Is Non- : No      Diabetic: No      Tobacco smoker: No      Systolic Blood Pressure: 110 mmHg      Is BP treated: No      HDL Cholesterol: 47 mg/dL      Total Cholesterol: 215 mg/dL    -     Lipid Panel; Future    Liver fibrosis  -     Comp Metabolic Panel (14); Future    Pre-diabetes  -     Hemoglobin A1C; Future         Health Maintenance Due   Topic Date Due    COVID-19 Vaccine (5 - 2023-24 season) 09/01/2024    DTaP,Tdap,and Td Vaccines (2 - Td or Tdap) 09/19/2024    Influenza Vaccine (1) 10/01/2024    Annual Physical  10/09/2024    Mammogram  12/30/2024    Colorectal Cancer Screening  02/02/2025    Pap Smear  03/23/2025         The patient indicates understanding of these issues and agrees to the plan.  Return in about 5 months (around 3/18/2025) for pap.  Reasurrance and education provided. All questions answered. Red flags/ ER precautions discussed.      Spent 40 minutes (10 in preventative and 30 in acute/chronic)  including chart review, reviewing appropriate medical history, evaluating patient, discussing treatment options, counseling and education (diet and exercise), ordering appropriate diagnostic tests and completing documentation.           [1] No Known Allergies

## 2024-10-22 ENCOUNTER — TELEPHONE (OUTPATIENT)
Age: 59
End: 2024-10-22

## 2024-10-25 RX ORDER — ERGOCALCIFEROL 1.25 MG/1
50000 CAPSULE, LIQUID FILLED ORAL WEEKLY
Qty: 12 CAPSULE | Refills: 0 | Status: SHIPPED | OUTPATIENT
Start: 2024-10-25 | End: 2025-01-11

## 2024-11-01 ENCOUNTER — TELEPHONE (OUTPATIENT)
Age: 59
End: 2024-11-01

## 2024-11-05 ENCOUNTER — TELEPHONE (OUTPATIENT)
Dept: INTERNAL MEDICINE CLINIC | Facility: CLINIC | Age: 59
End: 2024-11-05

## 2024-11-05 NOTE — TELEPHONE ENCOUNTER
----- Message from Ashley Suh sent at 11/4/2024  6:26 AM CST -----  Please notify pt of the results as they have not read the [employer] message

## 2024-11-06 NOTE — TELEPHONE ENCOUNTER
----- Message from Ashley Suh sent at 11/4/2024  6:26 AM CST -----  Please notify pt of the results as they have not read the [employer] message

## 2024-11-06 NOTE — TELEPHONE ENCOUNTER
----- Message from Ashley Suh sent at 11/4/2024  6:26 AM CST -----  Please notify pt of the results as they have not read the [employer] message

## 2024-11-18 ENCOUNTER — HOSPITAL ENCOUNTER (OUTPATIENT)
Dept: ULTRASOUND IMAGING | Age: 59
Discharge: HOME | End: 2024-11-18
Attending: FAMILY MEDICINE
Payer: COMMERCIAL

## 2024-11-18 DIAGNOSIS — K74.00 LIVER FIBROSIS: ICD-10-CM

## 2024-11-18 DIAGNOSIS — K76.0 FATTY LIVER: ICD-10-CM

## 2024-11-18 PROCEDURE — 76981 USE PARENCHYMA: CPT | Performed by: FAMILY MEDICINE

## 2024-11-18 PROCEDURE — 76705 ECHO EXAM OF ABDOMEN: CPT | Performed by: FAMILY MEDICINE

## 2024-12-13 ENCOUNTER — TELEPHONE (OUTPATIENT)
Facility: CLINIC | Age: 59
End: 2024-12-13

## 2024-12-13 NOTE — TELEPHONE ENCOUNTER
Patient outreach message received:    Entered into 3 yr CLN recall  Recall colon in 3 years per. Colon done 2/02/2022    Recall reminder letter sent out to patient via "[employer].

## 2025-01-21 DIAGNOSIS — F32.0 CURRENT MILD EPISODE OF MAJOR DEPRESSIVE DISORDER WITHOUT PRIOR EPISODE: ICD-10-CM

## 2025-01-21 RX ORDER — SERTRALINE HYDROCHLORIDE 25 MG/1
25 TABLET, FILM COATED ORAL DAILY
Qty: 90 TABLET | Refills: 0 | Status: SHIPPED | OUTPATIENT
Start: 2025-01-21

## 2025-03-03 ENCOUNTER — HOSPITAL ENCOUNTER (OUTPATIENT)
Dept: MAMMOGRAPHY | Facility: HOSPITAL | Age: 60
Discharge: HOME | End: 2025-03-03
Attending: FAMILY MEDICINE
Payer: COMMERCIAL

## 2025-03-03 DIAGNOSIS — Z12.31 ENCOUNTER FOR SCREENING MAMMOGRAM FOR MALIGNANT NEOPLASM OF BREAST: ICD-10-CM

## 2025-03-03 PROCEDURE — 77063 BREAST TOMOSYNTHESIS BI: CPT | Performed by: FAMILY MEDICINE

## 2025-03-03 PROCEDURE — 77067 SCR MAMMO BI INCL CAD: CPT | Performed by: FAMILY MEDICINE

## 2025-03-10 ENCOUNTER — HOSPITAL ENCOUNTER (OUTPATIENT)
Dept: CT IMAGING | Facility: HOSPITAL | Age: 60
End: 2025-03-10
Attending: FAMILY MEDICINE

## 2025-03-10 DIAGNOSIS — Z13.6 SCREENING FOR CARDIOVASCULAR CONDITION: ICD-10-CM

## 2025-03-10 NOTE — PROGRESS NOTES
Date of Service 3/10/2025    LEVI EDWARDS  Date of Birth 1/15/1965    Patient Age: 60 year old    PCP: Ashley Suh MD  [address]  [address]  [facility] [address]    Heart Scan Consult  Preliminary Heart Scan Score: 0    Previous Screening  Heart Scan Completed Previously: No                   Risk Factors  Personal Risk Factors  Alterable Risk Factors: Abnormal Cholesterol, Obstructive sleep apnea, Pre-Diabetes      Body Mass Index  BMI 35    Blood Pressure  /72 not on medication.  (Normal =< 120/80,  Elevated = 120-129/ >80,  High Stage1 130-139/80-89 , Stage2 >140/>90)    Lipid Profile  Cholesterol: 215, done on 10/14/2024.  HDL Cholesterol: 47, done on 10/14/2024.  LDL Cholesterol: 149, done on 10/14/2024.  TriGlycerides 106, done on 10/14/2024.  She is not on medication.  She has active order for labs including lipid panel.  Expected date by the PCP is 3/21/25.    Cholesterol Goals  Value   Total  =< 200   HDL  = > 45 Men = > 55 Women   LDL   =< 100   Triglycerides  =< 150       Glucose and Hemoglobin A1C  Pre diabetic - not on medication.  Lab Results   Component Value Date    GLU 97 10/14/2024    A1C 6.0 (H) 10/14/2024     (Normal Fasting Glucose < 100mg/dl )    Nurse Review  Risk factor information and results reviewed with Nurse: Yes (Discussed with the daughter.)    Recommended Follow Up:  Consult your physician regarding:: Final Heart Scan Report, Discuss potential for Incidental Finding, Discuss Potential for Score Variance (Cardiologist will review CT scan to confirm Heart Scan Calcium Score - this can take up to 2 weeks to read.  Radiologist will review CT scan for an over read - this can take up to 1 week to read.  2 Final reports will go to [facility] and to PCP.)      Recommendations for Change:  Nutrition Changes: Low Saturated Fat, Increase Fiber    Cholesterol Modification (goal of therapy depends upon your risk): Increase HDL (Healthy/Good) Normal >45 Men >55 Women, Decrease  LDL (Lousy/Bad) Ideal <100 (She has an active order for labs with expected date of 3/21/2025, includes lipid panel.)    Exercise: Enhance Current Program    Smoking Cessation: No Change Needed    Weight Management: Decrease Current Weight    Stress Management: Adopt Stress Management Techniques    Repeat Heart Scan: 5 years if Calcium Score is 0.0, Discuss with your Physician              [place] Recommended Resources:  Recommended Resources: Upcoming Classes, Medical Services and Health Library [email or web address]            Marita BECERRA RN        Please Contact the Nurse Heart Line with any Questions or Concerns [phone].

## 2025-03-17 ENCOUNTER — LAB ENCOUNTER (OUTPATIENT)
Dept: LAB | Facility: HOSPITAL | Age: 60
End: 2025-03-17
Attending: FAMILY MEDICINE
Payer: COMMERCIAL

## 2025-03-17 DIAGNOSIS — E78.00 HYPERCHOLESTEREMIA: ICD-10-CM

## 2025-03-17 DIAGNOSIS — E55.9 VITAMIN D DEFICIENCY: ICD-10-CM

## 2025-03-17 DIAGNOSIS — K74.00 LIVER FIBROSIS: ICD-10-CM

## 2025-03-17 DIAGNOSIS — R73.03 PRE-DIABETES: ICD-10-CM

## 2025-03-17 LAB
ALBUMIN SERPL-MCNC: 4.6 G/DL (ref 3.2–4.8)
ALBUMIN/GLOB SERPL: 1.6 {RATIO} (ref 1–2)
ALP LIVER SERPL-CCNC: 98 U/L
ALT SERPL-CCNC: 30 U/L
ANION GAP SERPL CALC-SCNC: 8 MMOL/L (ref 0–18)
AST SERPL-CCNC: 20 U/L (ref ?–34)
BILIRUB SERPL-MCNC: 0.6 MG/DL (ref 0.2–1.1)
BUN BLD-MCNC: 15 MG/DL (ref 9–23)
BUN/CREAT SERPL: 25.9 (ref 10–20)
CALCIUM BLD-MCNC: 9.4 MG/DL (ref 8.7–10.4)
CHLORIDE SERPL-SCNC: 107 MMOL/L (ref 98–112)
CHOLEST SERPL-MCNC: 202 MG/DL (ref ?–200)
CO2 SERPL-SCNC: 25 MMOL/L (ref 21–32)
CREAT BLD-MCNC: 0.58 MG/DL
EGFRCR SERPLBLD CKD-EPI 2021: 102 ML/MIN/1.73M2 (ref 60–?)
EST. AVERAGE GLUCOSE BLD GHB EST-MCNC: 126 MG/DL (ref 68–126)
FASTING PATIENT LIPID ANSWER: YES
FASTING STATUS PATIENT QL REPORTED: YES
GLOBULIN PLAS-MCNC: 2.8 G/DL (ref 2–3.5)
GLUCOSE BLD-MCNC: 85 MG/DL (ref 70–99)
HBA1C MFR BLD: 6 % (ref ?–5.7)
HDLC SERPL-MCNC: 53 MG/DL (ref 40–59)
LDLC SERPL CALC-MCNC: 130 MG/DL (ref ?–100)
NONHDLC SERPL-MCNC: 149 MG/DL (ref ?–130)
OSMOLALITY SERPL CALC.SUM OF ELEC: 290 MOSM/KG (ref 275–295)
POTASSIUM SERPL-SCNC: 4.1 MMOL/L (ref 3.5–5.1)
PROT SERPL-MCNC: 7.4 G/DL (ref 5.7–8.2)
SODIUM SERPL-SCNC: 140 MMOL/L (ref 136–145)
TRIGL SERPL-MCNC: 108 MG/DL (ref 30–149)
VIT D+METAB SERPL-MCNC: 36.4 NG/ML (ref 30–100)
VLDLC SERPL CALC-MCNC: 19 MG/DL (ref 0–30)

## 2025-03-17 PROCEDURE — 80061 LIPID PANEL: CPT | Performed by: FAMILY MEDICINE

## 2025-03-17 PROCEDURE — 83036 HEMOGLOBIN GLYCOSYLATED A1C: CPT | Performed by: FAMILY MEDICINE

## 2025-03-17 PROCEDURE — 80053 COMPREHEN METABOLIC PANEL: CPT | Performed by: FAMILY MEDICINE

## 2025-03-17 PROCEDURE — 82306 VITAMIN D 25 HYDROXY: CPT | Performed by: FAMILY MEDICINE

## 2025-03-31 ENCOUNTER — TELEPHONE (OUTPATIENT)
Facility: CLINIC | Age: 60
End: 2025-03-31

## 2025-03-31 DIAGNOSIS — Z12.11 SPECIAL SCREENING FOR MALIGNANT NEOPLASMS, COLON: Primary | ICD-10-CM

## 2025-03-31 DIAGNOSIS — Z86.0101 H/O ADENOMATOUS POLYP OF COLON: ICD-10-CM

## 2025-03-31 NOTE — TELEPHONE ENCOUNTER
Colon recall  Please advise on colonoscopy and bowel prep orders.   Medications, pharmacy, and allergies reviewed.     Age 45-76 y/o:   › MD preference: none  › Insurance:  [employer] [place]  › Last PCP visit: 10/18/2024  › Last CBC: 10/14/2024  › H/W/BMI: 4'11.5\"/177 lbs/35.17    Special comments/notes:  Recall    Last Procedure, Date, MD:   Dr. Porter, colon, 2022   Last diagnosis: See below   Recalled for (mth/yrs): 3 years   Sedation used previously: MAC   Last Prep Used: trilyte   Quality of Prep: good     Telephone Colon Screening Questionnaire Yes No   Are you currently experiencing any GI symptoms [] [x]   If yes, explain:     Rectal bleeding [] [x]   Black stool [] [x]   Dysphagia or food \"feeling stuck\" when eating [] [x]   Intractable vomiting [] [x]   Unexplained weight loss [] [x]   First colonoscopy [] [x]   Family history of colon cancer [] [x]   Any issues with anesthesia [] [x]   If yes, explain:      Any recent complaints related to chest pain &/or shortness of breath [] [x]   Referred to a cardiologist?  [] [x]   If yes, explain:      History of  respiratory issues/oxygen/KRYSTYNA/COPD  KRYSTYNA [x] []   CPAP/BiPAP:  CPAP     History of devices (pacemaker/defibrillator) [] [x]   History of heart attack &/or stroke [] [x]   If yes, in the last 12 months? Stent placement?  [] [x]     Medication Reconciliation  Yes  No   Anticoagulants (except Aspirin) [] [x]   Diabetic Medication [] [x]   Weight loss medication (phentermine/vyvanse/saxsenda/etc) [] [x]   Iron/herbal/multivitamin supplement(s)  Fish oil, vitamin d [x] []   Usage of marijuana, CBD &/or vape product(s) [] [x]       Vasile Porter MD   Physician  Specialty: GASTROENTEROLOGY     Procedures     Signed     Encounter Date: 2022     Signed         Colonoscopy Report           Carlene Frostkate PEACOCK 1/15/1965 Age 57 year old   PCP Ashley Suh MD Endoscopist Vasile Porter MD      Date of procedure: 22     Procedure:  Colonoscopy w/ biopsy and snare polypectomy     Pre-operative diagnosis: colorectal cancer screening     Post-operative diagnosis: colon and rectal polyps, hemorrhoids     Sedation: monitored anesthesia care (MAC)     Consent: We discussed the risks/benefits and alternatives to this procedure, as well as the planned sedation. Informed consent was obtained from the patient after the risks of the procedure were discussed, including but not limited to bleeding, perforation, aspiration, infection, or possibility of a missed lesion as well as the risks of anesthesia including but not limited to cardiopulmonary complications. The patient signed informed consent and elected to proceed with Colonoscopy with intervention [i.e. Biopsy, control of bleeding, dilatation, polypectomy, endoscopic mucosal resection, etc.] as indicated.     Colonoscopy procedure: Once an adequate level of sedation was obtained a digital rectal exam was completed revealing normal tone and no masses palpated. Then the lubricated tip of the [id number] diagnostic video colonoscope was carefully inserted and advanced without difficulty to the cecum using the air insufflation technique (only Co2 was used for insufflation). The cecum was identified by localizing the trifold, the appendix and the ileocecal valve. Withdrawal was begun with thorough washing and careful examination of the colonic walls and folds. The ascending colon was viewed twice in the forward view. Photodocumentation was obtained. The bowel prep was good. Views of the colon were good with washing. Withdrawal time was 20 minutes.     Air was then withdrawn and the endoscope was removed. The patient tolerated the procedure well. There were no immediate postoperative complications. The patient’s vital signs were monitored throughout the procedure and remained stable.     Estimated blood loss: insignificant     Specimens collected: colon and rectal polyps     Complications: none       Colonoscopy findings:  Terminal ileum: normal mucosa     Cecum: normal mucosa and vascular pattern     Ascending colon: there was a 3 mm polyp removed by cold biopsy forceps and a 4-5 mm polyp removed by cold snare polypectomy. Otherwise, normal mucosa and vascular pattern     Transverse colon: there was a 3 mm polyp removed by cold biopsy forceps. Otherwise, normal mucosa and vascular pattern     Descending colon: normal mucosa and vascular pattern     Sigmoid colon: there were two polyps measuring 2 mm removed by cold biopsy forceps. Otherwise, normal mucosa and vascular pattern     Rectum: retroflexed view showed small non-bleeding hemorrhoids. There was a 3 mm polyp removed by cold biopsy forceps and two adjacent polyps measuring 2 mm removed by cold biopsy forceps. Otherwise, normal mucosa and vascular pattern.     Impression:  1. 5 small/diminutive colon polyps and 3 diminutive rectal polyps removed  2. Small non-bleeding hemorrhoids  3. Otherwise, unremarkable colonoscopy     Recommend:  1. Await pathology.   2. Repeat colonoscopy interval pending final pathology results. If new signs or symptoms develop, procedure may need to be repeated sooner.   3. Continue your current medications  4. Follow up with your primary care physician on a routine basis     >>>If biopsies were performed and you have not received your pathology results either by phone or letter within 2 weeks, please call our office at [phone].     Vasile Porter MD  [facility] - Gastroenterology  2/2/2022                 Final Diagnosis:      A.  Ascending colon polyps; polypectomy:  Fragments of sessile serrated adenomas.     B.  Transverse colon polyp; polypectomy:  Fragment of tubular adenoma.     C.  Sigmoid colon polyps; polypectomy:  Fragments of hyperplastic polyps.     D.  Rectal polyps; polypectomy:  Fragment of hyperplastic polyp and fragment of sessile serrated adenoma.

## 2025-04-01 RX ORDER — SODIUM, POTASSIUM,MAG SULFATES 17.5-3.13G
SOLUTION, RECONSTITUTED, ORAL ORAL
Qty: 1 KIT | Refills: 0 | Status: SHIPPED | OUTPATIENT
Start: 2025-04-01

## 2025-04-01 NOTE — TELEPHONE ENCOUNTER
Ok to schedule colonoscopy with MAC with split suprep for colorectal cancer screening and history of adenomatous colon polyps at [facility] or hospital on a Friday     Thanks    Vasile Porter MD  [facility] - Gastroenterology

## 2025-04-01 NOTE — TELEPHONE ENCOUNTER
ARNALDO Viera's-    Please send prep to patient's pharmacy, patient is scheduled on 5/23/2025.    Thanks!

## 2025-04-01 NOTE — TELEPHONE ENCOUNTER
Scheduled for:  Colonoscopy [id number]  Provider Name:  Dr. Porter  Date:  5/23/2025  Location:  [facility]  Sedation:  MAC  Time:  7:30am, pt is aware emh will call with arrival time  Prep:  Suprep  Meds/Allergies Reconciled?:  Physician reviewed     Diagnosis with codes:  colorectal cancer screening Z12.11 and history of adenomatous colon polyps Z86.010  Was patient informed to call insurance with codes (Y/N):  Yes     Referral sent?:  Referral was sent at the time of electronic surgical scheduling.   EM or [facility] notified?:  I sent an electronic request to Endo Scheduling and received a confirmation today.      Medication Orders:  n/a  Misc Orders:  n/a     Further instructions given by staff:   I discussed the prep instructions with the patient which she verbally understood and is aware that I will send the instructions today.

## 2025-04-28 DIAGNOSIS — F32.0 CURRENT MILD EPISODE OF MAJOR DEPRESSIVE DISORDER WITHOUT PRIOR EPISODE: ICD-10-CM

## 2025-04-29 RX ORDER — SERTRALINE HYDROCHLORIDE 25 MG/1
25 TABLET, FILM COATED ORAL DAILY
Qty: 90 TABLET | Refills: 0 | Status: SHIPPED | OUTPATIENT
Start: 2025-04-29

## 2025-05-23 ENCOUNTER — HOSPITAL ENCOUNTER (OUTPATIENT)
Facility: HOSPITAL | Age: 60
Setting detail: HOSPITAL OUTPATIENT SURGERY
Discharge: HOME | End: 2025-05-23
Attending: INTERNAL MEDICINE | Admitting: INTERNAL MEDICINE
Payer: COMMERCIAL

## 2025-05-23 ENCOUNTER — ANESTHESIA EVENT (OUTPATIENT)
Dept: ENDOSCOPY | Facility: HOSPITAL | Age: 60
End: 2025-05-23
Payer: COMMERCIAL

## 2025-05-23 ENCOUNTER — ANESTHESIA (OUTPATIENT)
Dept: ENDOSCOPY | Facility: HOSPITAL | Age: 60
End: 2025-05-23
Payer: COMMERCIAL

## 2025-05-23 VITALS
HEIGHT: 60 IN | RESPIRATION RATE: 16 BRPM | BODY MASS INDEX: 33.38 KG/M2 | WEIGHT: 170 LBS | OXYGEN SATURATION: 95 % | DIASTOLIC BLOOD PRESSURE: 74 MMHG | HEART RATE: 57 BPM | SYSTOLIC BLOOD PRESSURE: 135 MMHG

## 2025-05-23 DIAGNOSIS — Z12.11 SPECIAL SCREENING FOR MALIGNANT NEOPLASMS, COLON: ICD-10-CM

## 2025-05-23 DIAGNOSIS — Z86.0101 H/O ADENOMATOUS POLYP OF COLON: ICD-10-CM

## 2025-05-23 PROCEDURE — 45380 COLONOSCOPY AND BIOPSY: CPT | Performed by: INTERNAL MEDICINE

## 2025-05-23 RX ORDER — LIDOCAINE HYDROCHLORIDE 10 MG/ML
INJECTION, SOLUTION EPIDURAL; INFILTRATION; INTRACAUDAL; PERINEURAL AS NEEDED
Status: DISCONTINUED | OUTPATIENT
Start: 2025-05-23 | End: 2025-05-23 | Stop reason: SURG

## 2025-05-23 RX ORDER — NALOXONE HYDROCHLORIDE 0.4 MG/ML
0.08 INJECTION, SOLUTION INTRAMUSCULAR; INTRAVENOUS; SUBCUTANEOUS ONCE AS NEEDED
Status: DISCONTINUED | OUTPATIENT
Start: 2025-05-23 | End: 2025-05-23

## 2025-05-23 RX ORDER — SODIUM CHLORIDE, SODIUM LACTATE, POTASSIUM CHLORIDE, CALCIUM CHLORIDE 600; 310; 30; 20 MG/100ML; MG/100ML; MG/100ML; MG/100ML
INJECTION, SOLUTION INTRAVENOUS CONTINUOUS
Status: DISCONTINUED | OUTPATIENT
Start: 2025-05-23 | End: 2025-05-23

## 2025-05-23 RX ADMIN — SODIUM CHLORIDE, SODIUM LACTATE, POTASSIUM CHLORIDE, CALCIUM CHLORIDE: 600; 310; 30; 20 INJECTION, SOLUTION INTRAVENOUS at 07:28:00

## 2025-05-23 RX ADMIN — LIDOCAINE HYDROCHLORIDE 50 MG: 10 INJECTION, SOLUTION EPIDURAL; INFILTRATION; INTRACAUDAL; PERINEURAL at 07:30:00

## 2025-05-23 NOTE — ANESTHESIA PREPROCEDURE EVALUATION
Anesthesia PreOp Note    HPI:     Carlene Littlejohn is a 60 year old female who presents for preoperative consultation requested by: Vasile Porter MD    Date of Surgery: 5/23/2025    Procedure(s):  COLONOSCOPY  Indication: Special screening for malignant neoplasms, colon / H/O adenomatous polyp of colon    Relevant Problems   No relevant active problems       NPO:  Last Liquid Consumption Date: 05/23/25  Last Liquid Consumption Time: 0530  Last Solid Consumption Date: 05/22/25  Last Solid Consumption Time: 1500  Last Liquid Consumption Date: 05/23/25          History Review:  Patient Active Problem List    Diagnosis Date Noted    KRYSTYNA on CPAP 10/17/2022    Hypercholesteremia 10/09/2018    Encounter for screening mammogram for breast cancer 10/09/2018    Current mild episode of major depressive disorder without prior episode 10/09/2018    Non morbid obesity due to excess calories 10/09/2018    BMI 31.0-31.9,adult 10/09/2018    History of Helicobacter pylori infection 09/26/2013       Past Medical History[1]    Past Surgical History[2]    Prescriptions Prior to Admission[3]  Current Medications and Prescriptions Ordered in Epic[4]    Allergies[5]    Family History[6]  Social Hx on file[7]    Available pre-op labs reviewed.     Lab Results   Component Value Date     03/17/2025    K 4.1 03/17/2025     03/17/2025    CO2 25.0 03/17/2025    BUN 15 03/17/2025    CREATSERUM 0.58 03/17/2025    GLU 85 03/17/2025    CA 9.4 03/17/2025          Vital Signs:  Body mass index is 33.2 kg/m².   height is 1.524 m (5') and weight is 77.1 kg (170 lb).   Vitals:    05/21/25 1529   Weight: 77.1 kg (170 lb)   Height: 1.524 m (5')        Anesthesia Evaluation     Patient summary reviewed and Nursing notes reviewed    Airway   Mallampati: II  TM distance: <3 FB  Neck ROM: full  Dental - Dentition appears grossly intact     Pulmonary    (+) sleep apnea on CPAP  Cardiovascular     Neuro/Psych      GI/Hepatic/Renal      Endo/Other     Abdominal   (+) obese                 Anesthesia Plan:   ASA:  2  Plan:   MAC  Informed Consent Plan and Risks Discussed With:  Patient      I have informed Carlene Littlejohn and/or legal guardian or family member of the nature of the anesthetic plan, benefits, risks including possible dental damage if relevant, major complications, and any alternative forms of anesthetic management.   All of the patient's questions were answered to the best of my ability. The patient desires the anesthetic management as planned.  Sav Mckoy CRNA  2025 7:21 AM  Present on Admission:  **None**           [1]   Past Medical History:   Hyperlipidemia    Sleep apnea    CPAP   [2]   Past Surgical History:  Procedure Laterality Date    Appendectomy            Colonoscopy  2022   [3]   Medications Prior to Admission   Medication Sig Dispense Refill Last Dose/Taking    SERTRALINE 25 MG Oral Tab TAKE ONE TABLET BY MOUTH ONE TIME DAILY 90 tablet 0 2025    Na Sulfate-K Sulfate-Mg Sulf (SUPREP BOWEL PREP KIT) 17.5-3.13-1.6 GM/177ML Oral Solution Take bowel preparation as provided by gastroenterology office, or visit our website at [email or web address] 1 kit 0 2025    Ergocalciferol (VITAMIN D OR) Take 1 tablet by mouth in the morning.   2025    Omega-3 Fatty Acids (FISH OIL OR) Take 1 tablet by mouth in the morning. (Patient not taking: Reported on 2025)   Not Taking   [4]   Current Facility-Administered Medications Ordered in Epic   Medication Dose Route Frequency Provider Last Rate Last Admin    lactated ringers infusion   Intravenous Continuous Vasile Porter MD         No current [facility]-ordered outpatient medications on file.   [5] No Known Allergies  [6]   Family History  Problem Relation Age of Onset    Lipids Father     No Known Problems Mother     No Known Problems Daughter     No Known Problems Son     Lipids Sister     Lipids Brother      No Known Problems Son     Lipids Brother     Lipids Brother     Lipids Brother     Lipids Brother     Lipids Brother     Lipids Sister     Lipids Sister     Lipids Sister     Lipids Sister     Cancer Neg     Diabetes Neg    [7]   Social History  Socioeconomic History    Marital status:    Tobacco Use    Smoking status: Never    Smokeless tobacco: Never   Substance and Sexual Activity    Alcohol use: No    Drug use: Not Currently

## 2025-05-23 NOTE — H&P
History & Physical Examination    Patient Name: Carlene Littlejohn  MRN: [id number]  CSN: [id number]  YOB: 1965    Diagnosis: colorectal cancer screening, history of adenomatous colon polyps    Last colonoscopy was in February 2022 for colorectal cancer screening with 8 subcentimeter polyps removed 4 of which were adenomatous.    Prescriptions Prior to Admission[1]  Current Hospital Medications[2]    Allergies: Allergies[3]    Past Medical History[4]  Past Surgical History[5]  Family History[6]  Social History     Tobacco Use    Smoking status: Never    Smokeless tobacco: Never   Substance Use Topics    Alcohol use: No       SYSTEM Check if Physical Exam is Normal If not normal, please explain:   HEENT [ X]    NECK  [ X]    HEART [ X]    LUNGS [ X]    ABDOMEN [ X]    EXTREMITIES [ X]      General:awake, cooperative, no acute distress  HEENT: EOMI, no scleral icterus, MMM; oral pharnyx is without exudates or lesions  Neck: no lymphadenopathy; thyroid is not enlarged and without nodules  CV: RRR  Resp: non-labored breathing  Abd: soft, non-tender, non-distended  Ext: no lower extremity swelling  Neuro: Alert, Oriented X 3  Skin: no rashes, bruises  Psych: normal affect  I have discussed the risks and benefits and alternatives of the procedure with the patient/family.  They understand and agreed to proceed with plan of care.   Vasile Porter MD  [facility] - Gastroenterology  5/23/2025  7:10 AM                   [1]   Medications Prior to Admission   Medication Sig Dispense Refill Last Dose/Taking    SERTRALINE 25 MG Oral Tab TAKE ONE TABLET BY MOUTH ONE TIME DAILY 90 tablet 0 5/22/2025    Na Sulfate-K Sulfate-Mg Sulf (SUPREP BOWEL PREP KIT) 17.5-3.13-1.6 GM/177ML Oral Solution Take bowel preparation as provided by gastroenterology office, or visit our website at [email or web address] 1 kit 0 5/22/2025    Ergocalciferol (VITAMIN D OR) Take 1 tablet  by mouth in the morning.   2025    Omega-3 Fatty Acids (FISH OIL OR) Take 1 tablet by mouth in the morning. (Patient not taking: Reported on 2025)   Not Taking   [2]   Current Facility-Administered Medications   Medication Dose Route Frequency    lactated ringers infusion   Intravenous Continuous   [3] No Known Allergies  [4]   Past Medical History:   Hyperlipidemia    Sleep apnea    CPAP   [5]   Past Surgical History:  Procedure Laterality Date    Appendectomy            Colonoscopy  2022   [6]   Family History  Problem Relation Age of Onset    Lipids Father     No Known Problems Mother     No Known Problems Daughter     No Known Problems Son     Lipids Sister     Lipids Brother     No Known Problems Son     Lipids Brother     Lipids Brother     Lipids Brother     Lipids Brother     Lipids Brother     Lipids Sister     Lipids Sister     Lipids Sister     Lipids Sister     Cancer Neg     Diabetes Neg

## 2025-05-23 NOTE — DISCHARGE INSTRUCTIONS
Home Care Instructions for Colonoscopy with Sedation    Diet:  - Resume your regular diet as tolerated unless otherwise instructed.  - Start with light meals to minimize bloating.  - Do not drink alcohol today.    Medication:  - If you have questions about resuming your normal medications, please contact your Primary Care Physician.    Activities:  - Take it easy today. Do not return to work today.  - Do not drive today.  - Do not operate any machinery today (including kitchen equipment).    Colonoscopy:  - You may notice some rectal \"spotting\" (a little blood on the toilet tissue) for a day or two after the exam. This is normal.  - If you experience any rectal bleeding (not spotting), persistent tenderness or sharp severe abdominal pains, oral temperature over 100 degrees Fahrenheit, light-headedness or dizziness, or any other problems, contact your doctor.      **If unable to reach your doctor, please go to the [facility] Emergency Room**    - Your referring physician will receive a full report of your examination.  - If you do not hear from your doctor's office within two weeks of your biopsy, please call them for your results.    You may be able to see your laboratory results in [facility] between 4 and 7 business days.  In some cases, your physician may not have viewed the results before they are released to [employer].  If you have questions regarding your results contact the physician who ordered the test/exam by phone or via [employer] by choosing \"Ask a Medical Question.\"

## 2025-05-23 NOTE — OPERATIVE REPORT
Colonoscopy Report    Carlene Littlejohn     1/15/1965 Age 60 year old   PCP Ashley Suh MD Endoscopist Vasile Porter MD     Date of procedure: 25    Procedure: Colonoscopy w/ biopsy    Pre-operative diagnosis: colorectal cancer screening, history of adenomatous colon polyps     Last colonoscopy was in 2022 for colorectal cancer screening with 8 subcentimeter polyps removed 4 of which were adenomatous.    Post-operative diagnosis: see impression    Sedation: monitored anesthesia care (MAC)    Consent: We discussed the risks/benefits and alternatives to this procedure, as well as the planned sedation. Informed consent was obtained from the patient after the risks of the procedure were discussed, including but not limited to bleeding, perforation, aspiration, infection, or possibility of a missed lesion as well as the risks of anesthesia including but not limited to cardiopulmonary complications. The patient signed informed consent and elected to proceed with Colonoscopy with intervention [i.e. Biopsy, control of bleeding, dilatation, polypectomy, endoscopic mucosal resection, etc.] as indicated.    Colonoscopy procedure: Once an adequate level of sedation was obtained a digital rectal exam was completed revealing normal tone and no masses palpated. Then the lubricated tip of the [id number] diagnostic video colonoscope was carefully inserted and advanced without difficulty to the cecum using the air insufflation technique (only Co2 was used for insufflation). The cecum was identified by localizing the trifold, the appendix and the ileocecal valve. Withdrawal was begun with thorough washing and careful examination of the colonic walls and folds. The ascending colon was viewed twice in the forward view. Photodocumentation was obtained. The bowel prep was good. Views of the colon were good with washing. Withdrawal time was 12 minutes.    Air was then withdrawn and the endoscope was  removed. The patient tolerated the procedure well. There were no immediate postoperative complications. The patient’s vital signs were monitored throughout the procedure and remained stable.    Estimated blood loss: insignificant    Specimens collected:  colon polyps, rectal polyp    Complications: none     Colonoscopy findings:  Cecum: There was 2 mm polyp removed by cold biopsy forceps. Otherwise, normal mucosa and vascular pattern.    Ascending colon: There was 3 mm polyp removed by cold biopsy forceps. Otherwise, normal mucosa and vascular pattern.    Transverse colon: normal mucosa and vascular pattern    Descending colon: normal mucosa and vascular pattern    Sigmoid colon: there was mild diverticulosis. Otherwise, normal mucosa and vascular pattern    Rectum: retroflexed view showed small to medium sized hemorrhoids. There was 2 mm polyp removed by cold biopsy forceps. Otherwise, normal mucosa and vascular pattern.    Impression:  1. 3 diminutive polyps removed  2. Mild sigmoid colon diverticulosis  3. Hemorrhoids  4. Otherwise, unremarkable colonoscopy    Recommend:  1. Await pathology.   2. Repeat colonoscopy interval pending final pathology results. If new signs or symptoms develop, procedure may need to be repeated sooner.   3. Continue your current medications  4. Increase fiber in the diet  5. Follow up with your primary care physician on a routine basis    >>>If biopsies were performed and you have not received your pathology results either by phone or letter within 2 weeks, please call our office at [phone].    Vasile Porter MD  [facility] - Gastroenterology  5/23/2025

## 2025-05-23 NOTE — ANESTHESIA POSTPROCEDURE EVALUATION
Patient: Carlene Littlejohn    Procedure Summary       Date: 05/23/25 Room / Location: [facility] 01 / [facility]    Anesthesia Start: 0728 Anesthesia Stop: 0753    Procedure: COLONOSCOPY Diagnosis:       Special screening for malignant neoplasms, colon      H/O adenomatous polyp of colon      (polyps, diverticulosis, hemorrhoids)    Surgeons: Vasile Porter MD Anesthesiologist: Sav Mckoy CRNA    Anesthesia Type: MAC ASA Status: 2            Anesthesia Type: MAC    Vitals Value Taken Time   /65 05/23/25 07:51   Temp 36.4 05/23/25 07:54   Pulse 64 05/23/25 07:52   Resp 22 05/23/25 07:52   SpO2 94 % 05/23/25 07:52   Vitals shown include unfiled device data.    [facility] AN Post Evaluation:   Patient Evaluated in PACU  Patient Participation: complete - patient participated  Level of Consciousness: awake and awake and alert  Pain Score: 0  Pain Management: adequate  Airway Patency:patent  Dental exam unchanged from preop  Yes    Nausea/Vomiting: none  Cardiovascular Status: acceptable, blood pressure returned to baseline and stable  Respiratory Status: acceptable  Postoperative Hydration acceptable      Sav Mckoy CRNA  5/23/2025 7:54 AM

## 2025-05-28 ENCOUNTER — TELEPHONE (OUTPATIENT)
Facility: CLINIC | Age: 60
End: 2025-05-28

## 2025-05-28 NOTE — TELEPHONE ENCOUNTER
----- Message from Vasile Porter sent at 5/28/2025 12:10 PM CDT -----  GI staff: please place recall for colonoscopy in 5 years     Thanks    Vasile Porter MD  [facility] - Gastroenterology  5/28/2025  12:09 PM    ----- Message -----  From: Lab, Background User  Sent: 5/23/2025   4:01 PM CDT  To: Vasile Porter MD    
Health maintenance updated. Last colonoscopy done 5/23/25. 5 year recall placed into Pt Outreach, next due on 05/2030 per Dr. Porter.    
Never smoker

## 2025-08-18 DIAGNOSIS — F32.0 CURRENT MILD EPISODE OF MAJOR DEPRESSIVE DISORDER WITHOUT PRIOR EPISODE: ICD-10-CM

## 2025-08-21 RX ORDER — SERTRALINE HYDROCHLORIDE 25 MG/1
25 TABLET, FILM COATED ORAL DAILY
Qty: 90 TABLET | Refills: 1 | Status: SHIPPED | OUTPATIENT
Start: 2025-08-21

## 2025-08-21 RX ORDER — SERTRALINE HYDROCHLORIDE 25 MG/1
25 TABLET, FILM COATED ORAL DAILY
Qty: 90 TABLET | Refills: 0 | OUTPATIENT
Start: 2025-08-21

## (undated) DIAGNOSIS — Z01.818 PREOP EXAMINATION: ICD-10-CM

## (undated) DIAGNOSIS — Z12.11 SPECIAL SCREENING FOR MALIGNANT NEOPLASMS, COLON: ICD-10-CM

## (undated) DIAGNOSIS — Z12.12 ENCOUNTER FOR COLORECTAL CANCER SCREENING: ICD-10-CM

## (undated) DIAGNOSIS — Z12.11 ENCOUNTER FOR COLORECTAL CANCER SCREENING: ICD-10-CM

## (undated) DEVICE — V2 SPECIMEN COLLECTION MANIFOLD KIT: Brand: NEPTUNE

## (undated) DEVICE — GIJAW SINGLE-USE BIOPSY FORCEPS WITH NEEDLE: Brand: GIJAW

## (undated) DEVICE — MEDI-VAC NON-CONDUCTIVE SUCTION TUBING 6MM X 1.8M (6FT.) L: Brand: CARDINAL HEALTH

## (undated) DEVICE — KIT ENDO ORCAPOD 160/180/190

## (undated) DEVICE — Device

## (undated) DEVICE — KIT CLEAN ENDOKIT 1.1OZ GOWNX2

## (undated) DEVICE — 60 ML SYRINGE REGULAR TIP: Brand: MONOJECT

## (undated) NOTE — LETTER
November 16, 2020      Donnella Buerger  [address]      Dear Tonja Gaffney:    Our office has been trying to contact you to discuss your recent test results. Please contact our office at your earliest convenience at [phone].

## (undated) NOTE — LETTER
[facility]   Administracion de Anestesia  Yo, Carlene Littlejohn, acepto ser atendido por un anestesiólogo, quien está especialmente capacitado para monitorearme y darme medicamento para hacerme dormir o mantenerme cómodo jeff mi procedimiento.   Entiendo que mi anestesiólogo no es un empleado o agente de [facility]. Él o yassine trabaja para [facility]  Machias el paciente que solicita los servicios de anestesia, estoy de acuerdo con lo siguiente:  Permitir al anestesiólogo (médico de anestesia) que me suministre el medicamento y hacer los procedimientos adicionales que micheline necesarios. Algunos ejemplos son: Al iniciar o utilizar ruma “IV” para suministrarme medicamentos, fluidos o mary jeff mi procedimiento, y colocarme ruma sonda de respiración, me ayudará a respirar cuando esté dormido (intubación). En el sandrine de que mi corazón deje de funcionar correctamente, entiendo que mi anestesiólogo hará todo lo posible para salvar mi doris, a menos que los documentos de No resucitar de [facility] lo indiquen de otra manera.  Informar a mi anestesista antes del procedimiento:  Si estoy embarazada.  La última vez que comí o bebí algo.  Todos los medicamentos que barrett (incluyendo medicamentos recetados, suplementos herbales y pastillas que puedo comprar sin receta médica (incluyendo drogas en la harp [medicamentos ilegales]). No informar a mi anestesiólogo acerca de estos medicamentos puede aumentar mi riesgo de complicaciones con la anestesia.  Si soy alérgico a cualquier cosa o he tenido previamente ruma reacción adversa a la anestesia.  Entiendo cómo la anestesia me ayudará (beneficios).  Entiendo que con cualquier tipo de anestesia hay riesgos:  Los riesgos más comunes son: náuseas, vómitos, dolor de garganta, dolor muscular, daño a los ojos, la boca o los dientes (por la colocación de la sonda de respiración).  Los riesgos poco comunes incluyen: recordar  lo que sucedió jeff mi procedimiento, reacciones alérgicas a los medicamentos, lesiones en las vías respiratorias, el corazón, los pulmones, la visión, los nervios o músculos, y en casos sumamente raros, la muerte.  Mii médico me ha explicado otras opciones de atención disponibles para mí (alternativas).  Pacientes embarazadas (“epidural”):    Entiendo que los riesgos de recibir ruma inyección epidural (medicamento que se aplica en la espalda para ayudar a controlar el dolor jeff el parto), incluyen picazón, presión arterial baja, dificultad para orinar, dolor de lyn o disminución en el ritmo del corazón del bebé. Los riesgos muy poco comunes incluyen infección, hemorragia, convulsiones, ritmo cardíaco irregular y lesión del nervio.  Anestesia regional (“columna vertebral”, “epidural” y “bloqueo de los nervios”):  Entiendo que hay complicaciones poco frecuentes lore posibles, e incluyen dolor de lyn, sangrado, infección, convulsiones, ritmo cardíaco irregular y la lesión del nervio.  .   Puedo cambiar de opinión acerca de recibir servicios de anestesia en cualquier momento antes de que se me administre el medicamento.         Paciente (o representante) Firma/Relación con el paciente  Fecha  Hora  Patient Signature/Signature of Responsible person Date Time           Nombre del intérprete (en mendoza sandrine)  Idioma/Organización  Hora  Name of  Language/Organization Time          Firma del anestesiólogo Fecha  Hora  Signature of anesthesiologist Date Time    He hablado del procedimiento y la información anterior con el paciente (o el representante del paciente) y respondí nisa preguntas. El paciente o mendoza representante ha aceptado recibir los servicios de anestesia.         Testigo Fecha  Hora  Witness Date Time  He verificado que la firma es la del paciente o del representante del paciente, y que se firmó antes del procedimiento.    Nombre del paciente: Carlene Littlejohn  Fec. de Nac.: 1/15/1965                  En letra de imprenta: May 20, 2025  Expediente médico No. [id number]                         Página 1 de 2  ----------ANESTHESIA CONSENT----------

## (undated) NOTE — LETTER
Patient Name: Carlene Littlejohn : 1/15/1965  Medical Record #: [id number] Printed: 2025  Page 1 of 2                                          [facility]  [address], [place], [place]  Autorización para operación y procedimiento quirúrgico                                                                                                      Por la presente, autorizo a Vasile Porter MD, mi médico y al asistente a realizar la operación/procedimiento quirúrgico a continuación, así bridger a administrar la anestesia que determine necesaria mi médico Nombre (s) de la operación/procedimiento: COLONOSCOPY en Carlene Littlejohn     Reconozco que jeff la operación/procedimiento quirúrgico, las condiciones imprevistas pueden requerir de procedimientos adicionales o diferentes a aquellos mencionados anteriormente.  Por lo tanto, autorizo y solicito además que el cirujano antes mencionado, los asistentes o las personas designadas realicen los procedimientos que, a mendoza juicio, micheline necesarios y convenientes.    Mi cirujano/médico bunn discutido antes de mi cirugía los posibles beneficios, riesgos y efectos secundarios de annie procedimiento, la probabilidad de alcanzar las metas y los posibles problemas que puedan ocurrir jeff la recuperación.  Ellos también espino discutido las alternativas razonables al procedimiento, incluso los riesgos, beneficios y efectos secundarios relacionados con las alternativas y los riesgos relacionados con no realizar annie procedimiento.  Espino respondido a todas mis preguntas y confirmo que no se ha dado ninguna garantía en cuanto a los resultados que pueda obtener.    En sandrine de que surja la necesidad jeff mi operación o jeff el periodo postoperatorio, también autorizo se aplique mary y/o productos sanguíneos.  Asimismo, entiendo que a pesar de las cuidadosas pruebas y análisis de mary o de los productos sanguíneos que realizan las entidades recolectoras, todavía puedo  estar sujeto a efectos adversos bridger resultado de recibir ruma transfusión de mary y/o productos sanguíneos.  A continuación se mencionan algunos, aunque no todos, los riesgos potenciales que pueden ocurrir: fiebre y reacciones alérgicas, reacciones hemolíticas, trasmisión de enfermedades bridger hepatitis, SIDA y citomegalovirus (CMV), así bridger sobrecarga de líquidos.   En sandrine de que desee tener ruma transfusión autóloga de mi propia mary o ruma transfusión de un donante dirigido.  Lo discutiré con mi médico.  Check only if Refusing Blood or Blood Products  I understand refusal of blood or blood products as deemed necessary by my physician may have serious consequences to my condition to include possible death. I hereby assume responsibility for my refusal and release the hospital, its personnel, and my physicians from any responsibility for the consequences of my refusal.           o  Refuse       Autorizo el uso de cualquier muestra, órgano, tejido, parte del cuerpo u objeto extraño que pueda ser extraído de mi cuerpo jeff la operación/procedimiento para fines de diagnóstico, investigación o de enseñanza y mendoza desecho posterior por las autoridades del hospital.  También, autorizo la revelación de los resultados de las pruebas de muestras y/o los informes escritos al médico tratante bridger personal médico del hospital u otros médicos de referencia o consulta involucrados en mi atención, a discreción del patólogo o de mi médico tratante.    Doy consentimiento para que se fotografíen o graben vídeos de las operaciones o procedimientos a realizarse, incluidas las partes de mi cuerpo que micheline adecuadas para propósitos médicos, científicos o educativos, en el entendido de que, mi identidad no sea revelada por las fotografías o por textos descriptivos que las acompañen.  Si el procedimiento es fotografiado o grabado en vídeo, el cirujano obtendrá la imagen, cinta de vídeo o CD original.  El hospital no se hará  responsable por el almacenamiento, la revelación o el mantenimiento de la imagen, cinta o CD.    Autorizo la presencia de un especialista de producto u observadores en el quirófano, según lo considere necesario mi médico o las personas que éste designe.     Reconozco que en sandrine de que mi procedimiento resulte en un tiempo prolongado de radiografía/fluoroscopia, puedo desarrollar ruma reacción en la piel.    Si tengo ruma orden de No intentar la reanimación (YAYO), vicki estado se suspenderá mientras esté en el quirófano, la barney de procedimientos y jeff el periodo de recuperación a menos que yo indique lo contrario explícitamente (o ruma persona autorizada a loco el consentimiento en mi nombre). El cirujano o mi médico tratante determinarán cuándo termina el periodo de recuperación aplicable a los efectos de restablecer la orden de YAYO.  Pacientes que se realizan un procedimiento de esterilización: Entiendo que si el procedimiento tiene éxito, los resultados serán permanentes y que, por lo tanto, me será imposible inseminar, concebir o tener hijos.  Asimismo, entiendo que el procedimiento tiene bridger propósito la esterilidad, aunque el resultado no está garantizado.   Admito que mi médico me ha explicado la aplicación de sedación/analgesia, incluidos los riesgos y beneficios y, consiento a la administración de sedación/analgesia conforme sea necesario o conveniente a juicio de mi médico.      CERTIFICO QUE HE LEÍDO Y COMPRENDIDO EL CONSENTIMIENTO ANTERIOR PARA LA OPERACIÓN y/o PROCEDIMIENTO.             ______________________________________  _______________________________  Firma del paciente      Firma de la persona responsible  Signature of patient      Signature of responsible person                        ___________________________________                                   Nombre en imprenta de la persona responsible         Name of responsible person          ___________________________________                  Relación con el paciente         Relationship to patient    _________________________________________  ______________ ________________  Firma del testigo          Fecha / Date Hora / Time  Signature of witness    DECLARACÓN DEL MÉDICO Mediante mi firma al calce, afirmo que antes de la hora del procedimiento, he explicadoal paciente y/o a mendoza representante legal, los riesgos y beneficios involucrados en el tratamiento propuesto, así comocualquier alternativa razonable al tratamiento propuesto. También le(s) he explicado los riesgos y beneficios involucradosen el rechazo del tratarniento propuesto y alternativas al tratamiento propuesto, y he respondido a las preguntas del(la) paciente(My signature below affirms that prior to the time of the procedure, I have explained to the patient and/or his/her guardian, therisks and benefits involved in the proposed treatment and any reasonable alternative to the proposed treatment. I have alsoexplained the risks and benefits involved in refusal of the proposed treatment and have answered the patient's questions.)    ________________________________________   _________________________   _____________   (Firma del médico/Signature of Physician)                                    (Fecha/Date)                                             (Hora/Time)      Patient Name: Carlene Littlejohn     : 1/15/1965                 Printed: 2025      Medical Record #: [id number]                                              Page 2 of 2

## (undated) NOTE — LETTER
November 2, 2022      [facility]  [address] [facility] [address]      Dear Srikanth Sullivan:    Our office has been trying to contact you to discuss your recent test results. Please contact our office at your earliest convenience at [phone]. Did you know that you can receive test result information and reminders securely through [address], which is available online or through the [employer] mobile reginaldo. To register for an account, you can go to [address] [employer]  and click \"Sign Up Now\" and use the \"Sign up Online\" link on the right. Follow the on-screen instructions to complete your registration. If at any time you are having difficulties, please contact our   [facility] Support Monday through Friday from 7 a.m. to 7 p.m. or on Saturdays and Sundays from 7 a.m. to 3 p.m. at [phone], option 3. Look for the [employer] branded [employer] in [employer] or from [employer]:        As always, thank you for selecting [facility] for your healthcare needs.     Sincerely,    Your Physician & Nursing Staff

## (undated) NOTE — LETTER
October 13, 2021      [facility]  [address] [place] [id number]      Dear Arianne Harris:    Our office has been trying to contact you to discuss your recent test results. Please contact our office at your earliest convenience at [phone].        As

## (undated) NOTE — LETTER
12/13/2024    Carlene Littlejohn        [address]        [facility] [address]            Dear Carlene Littlejohn,      Our records indicate that you are due for an appointment for a Colonoscopy with Vasile Porter MD. Our doctors are booking out about 3-6 months in advance for procedures.     Please call our office to schedule this appointment.  Your medical well-being is important to us.    If your insurance requires a referral, please call your primary care office to request one.      Thank you,      The Physicians and Staff at [facility]